# Patient Record
Sex: MALE | Race: BLACK OR AFRICAN AMERICAN | Employment: UNEMPLOYED | URBAN - METROPOLITAN AREA
[De-identification: names, ages, dates, MRNs, and addresses within clinical notes are randomized per-mention and may not be internally consistent; named-entity substitution may affect disease eponyms.]

---

## 2017-09-15 ENCOUNTER — OFFICE VISIT (OUTPATIENT)
Dept: FAMILY MEDICINE | Facility: CLINIC | Age: 37
End: 2017-09-15
Payer: COMMERCIAL

## 2017-09-15 VITALS
WEIGHT: 190 LBS | BODY MASS INDEX: 25.73 KG/M2 | TEMPERATURE: 97.2 F | SYSTOLIC BLOOD PRESSURE: 104 MMHG | DIASTOLIC BLOOD PRESSURE: 70 MMHG | HEART RATE: 92 BPM | HEIGHT: 72 IN | OXYGEN SATURATION: 97 %

## 2017-09-15 DIAGNOSIS — L40.9 PSORIASIS: Primary | ICD-10-CM

## 2017-09-15 DIAGNOSIS — M25.50 ARTHRALGIA, UNSPECIFIED JOINT: ICD-10-CM

## 2017-09-15 DIAGNOSIS — Z23 NEED FOR PROPHYLACTIC VACCINATION WITH TETANUS-DIPHTHERIA (TD): ICD-10-CM

## 2017-09-15 DIAGNOSIS — Z23 NEED FOR PROPHYLACTIC VACCINATION AND INOCULATION AGAINST INFLUENZA: ICD-10-CM

## 2017-09-15 LAB
ALBUMIN SERPL-MCNC: 3.6 G/DL (ref 3.4–5)
ALP SERPL-CCNC: 98 U/L (ref 40–150)
ALT SERPL W P-5'-P-CCNC: 32 U/L (ref 0–70)
ANION GAP SERPL CALCULATED.3IONS-SCNC: 7 MMOL/L (ref 3–14)
AST SERPL W P-5'-P-CCNC: 24 U/L (ref 0–45)
BILIRUB SERPL-MCNC: 0.6 MG/DL (ref 0.2–1.3)
BUN SERPL-MCNC: 14 MG/DL (ref 7–30)
CALCIUM SERPL-MCNC: 9.1 MG/DL (ref 8.5–10.1)
CHLORIDE SERPL-SCNC: 106 MMOL/L (ref 94–109)
CO2 SERPL-SCNC: 25 MMOL/L (ref 20–32)
CREAT SERPL-MCNC: 0.85 MG/DL (ref 0.66–1.25)
CRP SERPL-MCNC: <2.9 MG/L (ref 0–8)
ERYTHROCYTE [DISTWIDTH] IN BLOOD BY AUTOMATED COUNT: 12.9 % (ref 10–15)
ERYTHROCYTE [SEDIMENTATION RATE] IN BLOOD BY WESTERGREN METHOD: 11 MM/H (ref 0–15)
GFR SERPL CREATININE-BSD FRML MDRD: >90 ML/MIN/1.7M2
GLUCOSE SERPL-MCNC: 120 MG/DL (ref 70–99)
HCT VFR BLD AUTO: 44.8 % (ref 40–53)
HGB BLD-MCNC: 14.9 G/DL (ref 13.3–17.7)
MCH RBC QN AUTO: 28.1 PG (ref 26.5–33)
MCHC RBC AUTO-ENTMCNC: 33.3 G/DL (ref 31.5–36.5)
MCV RBC AUTO: 85 FL (ref 78–100)
PLATELET # BLD AUTO: 132 10E9/L (ref 150–450)
POTASSIUM SERPL-SCNC: 3.6 MMOL/L (ref 3.4–5.3)
PROT SERPL-MCNC: 7.4 G/DL (ref 6.8–8.8)
RBC # BLD AUTO: 5.3 10E12/L (ref 4.4–5.9)
SODIUM SERPL-SCNC: 138 MMOL/L (ref 133–144)
WBC # BLD AUTO: 4 10E9/L (ref 4–11)

## 2017-09-15 PROCEDURE — 90715 TDAP VACCINE 7 YRS/> IM: CPT | Performed by: FAMILY MEDICINE

## 2017-09-15 PROCEDURE — 86140 C-REACTIVE PROTEIN: CPT | Performed by: FAMILY MEDICINE

## 2017-09-15 PROCEDURE — 99214 OFFICE O/P EST MOD 30 MIN: CPT | Mod: 25 | Performed by: FAMILY MEDICINE

## 2017-09-15 PROCEDURE — 85652 RBC SED RATE AUTOMATED: CPT | Performed by: FAMILY MEDICINE

## 2017-09-15 PROCEDURE — 85027 COMPLETE CBC AUTOMATED: CPT | Performed by: FAMILY MEDICINE

## 2017-09-15 PROCEDURE — 86431 RHEUMATOID FACTOR QUANT: CPT | Performed by: FAMILY MEDICINE

## 2017-09-15 PROCEDURE — 86038 ANTINUCLEAR ANTIBODIES: CPT | Performed by: FAMILY MEDICINE

## 2017-09-15 PROCEDURE — 90686 IIV4 VACC NO PRSV 0.5 ML IM: CPT | Performed by: FAMILY MEDICINE

## 2017-09-15 PROCEDURE — 36415 COLL VENOUS BLD VENIPUNCTURE: CPT | Performed by: FAMILY MEDICINE

## 2017-09-15 PROCEDURE — 80053 COMPREHEN METABOLIC PANEL: CPT | Performed by: FAMILY MEDICINE

## 2017-09-15 RX ORDER — CLOBETASOL PROPIONATE 0.5 MG/G
OINTMENT TOPICAL
Qty: 60 G | Refills: 3 | Status: SHIPPED | OUTPATIENT
Start: 2017-09-15 | End: 2018-05-23

## 2017-09-15 NOTE — LETTER
September 19, 2017      León Myers  [address]  [facility] [address]        Dear ,    I have reviewed your recent labs. Here are the results:       -Liver and gallbladder tests are normal. (ALT,AST, Alk phos, bilirubin), kidney function is normal (Cr, GFR), Sodium is normal, Potassium is normal, Calcium is normal, Glucose is normal (diabetes screening test).   -Normal red blood cell (hgb) levels, normal white blood cell count and slight low platelets, which has in this range previously.   All labs for joint pain including inflammation labs are also normal.   No inflammation identified.     Resulted Orders   ESR: Erythrocyte sedimentation rate   Result Value Ref Range    Sed Rate 11 0 - 15 mm/h   CRP, inflammation   Result Value Ref Range    CRP Inflammation <2.9 0.0 - 8.0 mg/L   Rheumatoid factor   Result Value Ref Range    Rheumatoid Factor <20 <20 IU/mL   Comprehensive metabolic panel   Result Value Ref Range    Sodium 138 133 - 144 mmol/L    Potassium 3.6 3.4 - 5.3 mmol/L    Chloride 106 94 - 109 mmol/L    Carbon Dioxide 25 20 - 32 mmol/L    Anion Gap 7 3 - 14 mmol/L    Glucose 120 (H) 70 - 99 mg/dL      Comment:      Fasting specimen    Urea Nitrogen 14 7 - 30 mg/dL    Creatinine 0.85 0.66 - 1.25 mg/dL    GFR Estimate >90 >60 mL/min/1.7m2      Comment:      Non  GFR Calc    GFR Estimate If Black >90 >60 mL/min/1.7m2      Comment:       GFR Calc    Calcium 9.1 8.5 - 10.1 mg/dL    Bilirubin Total 0.6 0.2 - 1.3 mg/dL    Albumin 3.6 3.4 - 5.0 g/dL    Protein Total 7.4 6.8 - 8.8 g/dL    Alkaline Phosphatase 98 40 - 150 U/L    ALT 32 0 - 70 U/L    AST 24 0 - 45 U/L   CBC with platelets   Result Value Ref Range    WBC 4.0 4.0 - 11.0 10e9/L    RBC Count 5.30 4.4 - 5.9 10e12/L    Hemoglobin 14.9 13.3 - 17.7 g/dL    Hematocrit 44.8 40.0 - 53.0 %    MCV 85 78 - 100 fl    MCH 28.1 26.5 - 33.0 pg    MCHC 33.3 31.5 - 36.5 g/dL    RDW 12.9 10.0 - 15.0 %    Platelet  Count 132 (L) 150 - 450 10e9/L      Comment:      Results confirmed by repeat test   Anti Nuclear Kassi IgG by IFA with Reflex   Result Value Ref Range    YESSICA interpretation Negative NEG^Negative      Comment:                                         Reference range:  <1:40  NEGATIVE  1:40 - 1:80  BORDERLINE POSITIVE  >1:80 POSITIVE         If you have any questions or concerns, please call the clinic at the number listed above.       Sincerely,  Sidney Corona MD

## 2017-09-15 NOTE — PROGRESS NOTES
SUBJECTIVE:   León Myers is a 37 year old male who presents to clinic today for the following health issues:      Rash  Onset: ~5 years   On the legs bilateral but now more worse on the right leg, silvery dry scales.  Its itchy. He has used once cream 2 years ago but it has not helped.      Description:   Location: legs   Character: flakey  Itching (Pruritis): yes      Progression of Symptoms:  worsening    Accompanying Signs & Symptoms:  Fever: no   Body aches or joint pain: no   Sore throat symptoms: no   Recent cold symptoms: no     History:   Previous similar rash: no     Precipitating factors:   Exposure to similar rash: no   New exposures: None   Recent travel: no       Therapies Tried and outcome: otc cream no help     Also complains of joint pain bilateral ankle and knee. Would like to get some labs work done.          Problem list and histories reviewed & adjusted, as indicated.  Additional history: as documented    Patient Active Problem List   Diagnosis     Neck pain     Esophageal reflux     H. pylori infection     Body aches     Past Surgical History:   Procedure Laterality Date     NO HISTORY OF SURGERY         Social History   Substance Use Topics     Smoking status: Current Every Day Smoker     Packs/day: 0.50     Types: Cigarettes     Smokeless tobacco: Never Used     Alcohol use No     Family History   Problem Relation Age of Onset     DIABETES No family hx of      C.A.D. No family hx of          Current Outpatient Prescriptions   Medication Sig Dispense Refill     clobetasol (TEMOVATE) 0.05 % ointment Apply sparingly to affected area twice daily as needed.  Do not apply to face. 60 g 3         Reviewed and updated as needed this visit by clinical staffTobacco  Allergies  Meds  Soc Hx      Reviewed and updated as needed this visit by Provider         ROS:  C: NEGATIVE for fever, chills, change in weight  INTEGUMENTARY/SKIN: scaly rash on the legs.  E/M: NEGATIVE for ear, mouth and throat  problems  R: NEGATIVE for significant cough or SOB  CV: NEGATIVE for chest pain, palpitations or peripheral edema    OBJECTIVE:                                                    /70  Pulse 92  Temp 97.2  F (36.2  C) (Tympanic)  Ht 6' (1.829 m)  Wt 190 lb (86.2 kg)  SpO2 97%  BMI 25.77 kg/m2  Body mass index is 25.77 kg/(m^2).   GENERAL: healthy, alert, well nourished, well hydrated, no distress  NECK: no tenderness, no adenopathy, no asymmetry, no masses, no stiffness; thyroid- normal to palpation  RESP: lungs clear to auscultation - no rales, no rhonchi, no wheezes  CV: regular rates and rhythm, normal S1 S2, no S3 or S4 and no murmur, no click or rub -  Patient has slivery scales rash on the back of the leg.     ASSESSMENT/PLAN:                                                        ICD-10-CM    1. Psoriasis L40.9 clobetasol (TEMOVATE) 0.05 % ointment     DERMATOLOGY REFERRAL     ESR: Erythrocyte sedimentation rate     CRP, inflammation     CBC with platelets   2. Need for prophylactic vaccination and inoculation against influenza Z23 FLU VAC, SPLIT VIRUS IM > 3 YO (QUADRIVALENT) [76545]   3. Need for prophylactic vaccination with tetanus-diphtheria (TD) Z23 TDAP VACCINE (ADACEL)   4. Arthralgia, unspecified joint M25.50 ESR: Erythrocyte sedimentation rate     CRP, inflammation     Rheumatoid factor     Comprehensive metabolic panel     Anti Nuclear Kassi IgG by IFA with Reflex       Patient has some chronic changes on the skin most likely psoriasis in nature.  Suggested high strength steroid cream to be applied on the skin.   Referral to dermatologist given.  Joint pain non specific but they are symmetrical will get some inflamatory labs and will follow up on that. If needed further evaluation can be done.    Sidney Corona MD  [facility]  Injectable Influenza Immunization Documentation    1.  Are you sick today? (Fever of 100.5 or higher on the day of the clinic)   No    2.  Have you  ever had Guillain-[place] Syndrome within 6 weeks of an influenza vaccionation?  No    3. Do you have a life-threatening allergy to eggs?  No    4. Do you have a life-threatening allergy to a component of the vaccine? May include antibiotics, gelatin or latex.  No     5. Have you ever had a reaction to a dose of flu vaccine that needed immediate medical attention?  No     Form completed by Jose PAYTON CMA

## 2017-09-15 NOTE — NURSING NOTE
Chief Complaint   Patient presents with     Derm Problem     patches of dry skin on legs        Initial /70  Pulse 92  Temp 97.2  F (36.2  C) (Tympanic)  Ht 6' (1.829 m)  Wt 190 lb (86.2 kg)  SpO2 97%  BMI 25.77 kg/m2 Estimated body mass index is 25.77 kg/(m^2) as calculated from the following:    Height as of this encounter: 6' (1.829 m).    Weight as of this encounter: 190 lb (86.2 kg).  Medication Reconciliation: complete    Current Outpatient Prescriptions   Medication Sig Dispense Refill     naproxen (NAPROSYN) 500 MG tablet Take 1 tablet (500 mg) by mouth 2 times daily (with meals) (Patient not taking: Reported on 9/15/2017) 30 tablet 0     benzonatate (TESSALON) 100 MG capsule Take 1 capsule (100 mg) by mouth 3 times daily as needed (Patient not taking: Reported on 9/15/2017) 30 capsule 0     nabumetone (RELAFEN) 500 MG tablet Take 1-2 tablets (500-1,000 mg) by mouth 2 times daily as needed for moderate pain (Patient not taking: Reported on 9/15/2017) 30 tablet 1     triamcinolone (KENALOG) 0.1 % cream Apply sparingly to affected area three times daily for 14 days. (Patient not taking: Reported on 9/15/2017) 15 g 0     Cholecalciferol (VITAMIN D) 2000 UNITS tablet Take 2,000 Units by mouth daily (Patient not taking: Reported on 9/15/2017) 100 tablet 3     omeprazole (PRILOSEC) 40 MG capsule Take 1 capsule (40 mg) by mouth daily Take 30-60 minutes before a meal. (Patient not taking: Reported on 9/15/2017) 90 capsule 3     nabumetone (RELAFEN) 500 MG tablet Take 1-2 tablets (500-1,000 mg) by mouth 2 times daily as needed for moderate pain (Patient not taking: Reported on 9/15/2017) 30 tablet 1       Jose PAYTON CMA

## 2017-09-17 LAB — RHEUMATOID FACT SER NEPH-ACNC: <20 IU/ML (ref 0–20)

## 2017-09-19 LAB — ANA SER QL IF: NEGATIVE

## 2017-12-04 ENCOUNTER — RADIANT APPOINTMENT (OUTPATIENT)
Dept: GENERAL RADIOLOGY | Facility: CLINIC | Age: 37
End: 2017-12-04
Attending: FAMILY MEDICINE
Payer: COMMERCIAL

## 2017-12-04 ENCOUNTER — OFFICE VISIT (OUTPATIENT)
Dept: FAMILY MEDICINE | Facility: CLINIC | Age: 37
End: 2017-12-04
Payer: COMMERCIAL

## 2017-12-04 ENCOUNTER — OFFICE VISIT (OUTPATIENT)
Dept: ORTHOPEDICS | Facility: CLINIC | Age: 37
End: 2017-12-04
Payer: COMMERCIAL

## 2017-12-04 VITALS
TEMPERATURE: 97.5 F | HEART RATE: 87 BPM | DIASTOLIC BLOOD PRESSURE: 70 MMHG | SYSTOLIC BLOOD PRESSURE: 100 MMHG | WEIGHT: 191 LBS | BODY MASS INDEX: 25.9 KG/M2

## 2017-12-04 VITALS
HEIGHT: 72 IN | BODY MASS INDEX: 25.87 KG/M2 | DIASTOLIC BLOOD PRESSURE: 70 MMHG | SYSTOLIC BLOOD PRESSURE: 110 MMHG | WEIGHT: 191 LBS

## 2017-12-04 DIAGNOSIS — M54.42 CHRONIC LEFT-SIDED LOW BACK PAIN WITH LEFT-SIDED SCIATICA: ICD-10-CM

## 2017-12-04 DIAGNOSIS — M25.552 PAIN IN JOINT INVOLVING LEFT PELVIC REGION AND THIGH: ICD-10-CM

## 2017-12-04 DIAGNOSIS — G89.29 CHRONIC LEFT-SIDED LOW BACK PAIN WITH LEFT-SIDED SCIATICA: ICD-10-CM

## 2017-12-04 DIAGNOSIS — M20.12 HALLUX VALGUS, ACQUIRED, LEFT: ICD-10-CM

## 2017-12-04 DIAGNOSIS — L84 CALLUS OF FOOT: Primary | ICD-10-CM

## 2017-12-04 DIAGNOSIS — M25.552 PAIN IN JOINT INVOLVING LEFT PELVIC REGION AND THIGH: Primary | ICD-10-CM

## 2017-12-04 PROCEDURE — 99214 OFFICE O/P EST MOD 30 MIN: CPT | Performed by: FAMILY MEDICINE

## 2017-12-04 PROCEDURE — 73502 X-RAY EXAM HIP UNI 2-3 VIEWS: CPT

## 2017-12-04 PROCEDURE — 72100 X-RAY EXAM L-S SPINE 2/3 VWS: CPT

## 2017-12-04 PROCEDURE — 99213 OFFICE O/P EST LOW 20 MIN: CPT | Mod: 25 | Performed by: PHYSICIAN ASSISTANT

## 2017-12-04 PROCEDURE — 11056 PARNG/CUTG B9 HYPRKR LES 2-4: CPT | Mod: T4 | Performed by: PHYSICIAN ASSISTANT

## 2017-12-04 ASSESSMENT — ENCOUNTER SYMPTOMS
BACK PAIN: 1
FOCAL WEAKNESS: 0
MYALGIAS: 1
TINGLING: 1
SENSORY CHANGE: 1

## 2017-12-04 NOTE — NURSING NOTE
Chief Complaint   Patient presents with     Headache     Musculoskeletal Problem       Initial /70 (BP Location: Left arm, Patient Position: Chair, Cuff Size: Adult Regular)  Pulse 87  Temp 97.5  F (36.4  C) (Tympanic)  Wt 191 lb (86.6 kg)  BMI 25.9 kg/m2 Estimated body mass index is 25.9 kg/(m^2) as calculated from the following:    Height as of 9/15/17: 6' (1.829 m).    Weight as of this encounter: 191 lb (86.6 kg).  Medication Reconciliation: complete

## 2017-12-04 NOTE — PROGRESS NOTES
[facility]   Clinic Visit s Dec 4, 2017    PCP: Eusebio Farnsworth      León is a 37 year old male who is seen as self referral for   Chief Complaint   Patient presents with     Hip Pain       Injury: Reports insidious onset without acute precipitating event.    Location of Pain: left lateral ankle, posterior and anterior hip  Duration of Pain:  2 years, worse in last 2 month(s)  Rating of Pain at worst: 9/10  Rating of Pain Currently: 6/10  Pain is worse with: walking, bending and standing  Pain is better with: resting and sitting  Treatment so far consists of: ice, heat and ibuprofen  Associated symptoms: weakness of left leg, intermittent tingling and numbness  Prior History of related problems: none    Social History: works at [employer]     Past Medical History:   Diagnosis Date     Body aches 6/26/2014     Ear pain 6/26/2014     TB (tuberculosis), treated      He was treated for TB in 1991 in [facility]        Patient Active Problem List    Diagnosis Date Noted     Body aches 06/26/2014     Priority: Medium     Neck pain 06/18/2014     Priority: Medium     Esophageal reflux 06/18/2014     Priority: Medium     H. pylori infection 06/18/2014     Priority: Medium       Family History   Problem Relation Age of Onset     DIABETES No family hx of      C.A.D. No family hx of        Social History     Social History     Marital status:      Spouse name: N/A     Number of children: N/A     Years of education: N/A     Social History Main Topics     Smoking status: Current Every Day Smoker     Packs/day: 0.50     Types: Cigarettes     Smokeless tobacco: Never Used     Past Surgical History:   Procedure Laterality Date     NO HISTORY OF SURGERY         Review of Systems   Musculoskeletal: Positive for back pain, joint pain and myalgias.   Neurological: Positive for tingling and sensory change. Negative for focal weakness.   All other systems reviewed and are negative.        Physical Exam  BP  110/70  Ht 6' (1.829 m)  Wt 191 lb (86.6 kg)  BMI 25.9 kg/m2  Constitutional:well-developed, well-nourished, and in no distress.   Cardiovascular: Intact distal pulses.    Neurological: alert. Gait Normal:   Gait, station, stance, and balance appear normal for age  Skin: Skin is warm and dry.   Psychiatric: Mood and affect normal.   Respiratory: unlabored, speaks in full sentences  Lymph: no LAD, no lymphangitis      Left Ankle Exam   Swelling: None.    Tenderness   None    Range of Motion   Dorsiflexion:     Normal  Plantar flexion: Normal  Inversion:         Normal  Eversion:         Normal    Muscle Strength   Dorsiflexion:          5/5  Plantar flexion:      5/5  Anterior tibial:        5/5  Posterior tibial:      5/5  Gastrosoleus:       5/5  Peroneal muscle: 5/5    Tests   Anterior drawer: Negative.  Varus tilt: Negative.    Left Hip Exam   Gait: Normal.    Tenderness   None    Range of Motion   Extension:            Normal  Flexion:                 Normal  Internal Rotation:  Normal  External Rotation: Normal  Abduction:            Normal  Adduction:            Normal    Muscle Strength   Abduction:  5/5  Adduction:  5/5  Flexion:      4/5    Tests   Solis: Negative  Arnold:  N/t    Right Hip Exam   Gait: Normal.    Back Exam   Sensation: Normal.  Gait: Normal.    Tenderness   None    Range of Motion   Flexion:                    Abnormal  Extension:                Abnormal  Lateral Bend Left:    Normal  Lateral Bend Right:  Normal  Rotation Right:         Normal  Rotation Left:           Normal  SLR    Right: Negative  Left:    + at 70 deg    Muscle Strength   Normal back strength    Tests   Toe Walk: Normal  Heel Walk: Normal    Reflexes   Patellar:  Normal  Achilles:  Normal          X-ray images Ordered and independently reviewed by me in the office today with the patient. X-ray shows: normal      Recent Results (from the past 48 hour(s))   XR Lumbar Spine 2/3 Views    Narrative    XR LUMBAR SPINE 2-3  VIEWS 12/4/2017 7:01 PM     HISTORY: ; Chronic left-sided low back pain with left-sided sciatica;  Chronic left-sided low back pain with left-sided sciatica      Impression    IMPRESSION: Negative exam.    TAMIKO DALAL MD   XR Pelvis and Hip Left 1 View    Narrative    XR PELVIS AND HIP LEFT 1 VIEW 12/4/2017 7:02 PM     HISTORY: ; Pain in joint involving left pelvic region and thigh      Impression    IMPRESSION: Negative exam.    TAMIKO DALAL MD         ASSESSMENT/PLAN    ICD-10-CM    1. Pain in joint involving left pelvic region and thigh M25.552 XR Pelvis and Hip Left 1 View   2. Chronic left-sided low back pain with left-sided sciatica M54.42 XR Lumbar Spine 2/3 Views    G89.29 MR Lumbar Spine w/o Contrast     Poorly defined LLE pain with lumbar component, with minimal objective radicular findings. longstanding symptoms with radicular history raise concern nerve root impingement    MRI ordered, León instructed to return at least 2 days following MRI to review results and determine treatment plan

## 2017-12-04 NOTE — MR AVS SNAPSHOT
After Visit Summary   12/4/2017    León Myers    MRN: [id number]           Patient Information     Date Of Birth          1980        Visit Information        Provider Department      12/4/2017 1:30 PM Evan Bedolla PA-C; [facility] [place]        Today's Diagnoses     Callus of foot    -  1    Hallux valgus, acquired, left           Follow-ups after your visit        Additional Services     PODIATRY/FOOT & ANKLE SURGERY REFERRAL       Your provider has referred you to: [facility]: [facility] [place] [phone]   [email or web address]    Please be aware that coverage of these services is subject to the terms and limitations of your health insurance plan.  Call member services at your health plan with any benefit or coverage questions.      Please bring the following to your appointment:  >>   Any x-rays, CTs or MRIs which have been performed.  Contact the facility where they were done to arrange for  prior to your scheduled appointment.    >>   List of current medications   >>   This referral request   >>   Any documents/labs given to you for this referral                  Your next 10 appointments already scheduled     Dec 04, 2017  6:00 PM CST   New Visit with Romario Moreira MD   [facility] [place] [facility] ([facility] [place])    [address] [place] [address] [address] [facility] [address]  [place] [address] [phone]              Who to contact     If you have questions or need follow up information about today's clinic visit or your schedule please contact [facility] [place] directly at [phone].  Normal or non-critical lab and imaging results will be communicated to you by MyChart, letter or phone within 4 business days after the clinic has received the results. If you do not hear from us within 7 days, please contact the clinic through MyChart or phone. If you  "have a critical or abnormal lab result, we will notify you by phone as soon as possible.  Submit refill requests through [employer] or call your pharmacy and they will forward the refill request to us. Please allow 3 business days for your refill to be completed.          Additional Information About Your Visit        [employer] Information     [employer] lets you send messages to your doctor, view your test results, renew your prescriptions, schedule appointments and more. To sign up, go to [email or web address] . Click on \"Log in\" on the left side of the screen, which will take you to the Welcome page. Then click on \"Sign up Now\" on the right side of the page.     You will be asked to enter the access code listed below, as well as some personal information. Please follow the directions to create your username and password.     Your access code is: [email or web address]  Expires: 2017  9:04 AM     Your access code will  in 90 days. If you need help or a new code, please call your [facility] clinic or [phone].        Care EveryWhere ID     This is your Care EveryWhere ID. This could be used by other organizations to access your [facility] medical records  [id number]        Your Vitals Were     Pulse Temperature BMI (Body Mass Index)             87 97.5  F (36.4  C) (Tympanic) 25.9 kg/m2          Blood Pressure from Last 3 Encounters:   17 100/70   09/15/17 104/70   16 100/80    Weight from Last 3 Encounters:   17 191 lb (86.6 kg)   09/15/17 190 lb (86.2 kg)   16 183 lb (83 kg)              We Performed the Following     PODIATRY/FOOT & ANKLE SURGERY REFERRAL          Today's Medication Changes          These changes are accurate as of: 17  2:33 PM.  If you have any questions, ask your nurse or doctor.               Start taking these medicines.        Dose/Directions    Salicylic Acid 40 % Pads   Commonly known as:  CORN REMOVERS   Used for:  Callus of foot   Started by:  Evan Bedolla " JOEY Garcia        Dose:  1 each   Externally apply 1 each topically daily   Quantity:  4 each   Refills:  0            Where to get your medicines      These medications were sent to [id number] - [place] - [address] AT [facility] OF [facility] [address] [facility]  [address], [place] [address]     Phone:  [phone]     Salicylic Acid 40 % Pads                Primary Care Provider Office Phone # Fax #    Eusebio KHAN MD Javad [phone]       [address]  [place] [address]        Equal Access to Services     [facility] : Hadii aad ku hadasho Soomaali, waaxda luqadaha, qaybta kaalmada adeegyada, waxay idiin hayaan adeeg reynaldo spencer ah. So [facility] [place] [phone].    ATENCIÓN: Si habla español, tiene a fraser disposición servicios gratuitos de asistencia lingüística. [facility] [phone].    We comply with applicable federal civil rights laws and [place] laws. We do not discriminate on the basis of race, color, national origin, age, disability, sex, sexual orientation, or gender identity.            Thank you!     Thank you for choosing [facility]  for your care. Our goal is always to provide you with excellent care. Hearing back from our patients is one way we can continue to improve our services. Please take a few minutes to complete the written survey that you may receive in the mail after your visit with us. Thank you!             Your Updated Medication List - Protect others around you: Learn how to safely use, store and throw away your medicines at www.disposemymeds.org.          This list is accurate as of: 12/4/17  2:33 PM.  Always use your most recent med list.                   Brand Name Dispense Instructions for use Diagnosis    clobetasol 0.05 % ointment    TEMOVATE    60 g    Apply sparingly to affected area twice daily as needed.  Do not apply to face.    Psoriasis       Salicylic Acid 40 % Pads    CORN REMOVERS     4 each    Externally apply 1 each topically daily    Callus of foot

## 2017-12-04 NOTE — LETTER
12/4/2017         RE: León Myers  [address] [facility] [place] [address]        Dear Colleague,    Thank you for referring your patient, León Myers, to the [facility]. Please see a copy of my visit note below.    HPI   [facility]   Clinic Visit s Dec 4, 2017    PCP: Eusebio Farnsworth      León is a 37 year old male who is seen as self referral for   Chief Complaint   Patient presents with     Hip Pain       Injury: Reports insidious onset without acute precipitating event.    Location of Pain: left lateral ankle, posterior and anterior hip  Duration of Pain:  2 years, worse in last 2 month(s)  Rating of Pain at worst: 9/10  Rating of Pain Currently: 6/10  Pain is worse with: walking, bending and standing  Pain is better with: resting and sitting  Treatment so far consists of: ice, heat and ibuprofen  Associated symptoms: weakness of left leg, intermittent tingling and numbness  Prior History of related problems: none    Social History: works at [employer]     Past Medical History:   Diagnosis Date     Body aches 6/26/2014     Ear pain 6/26/2014     TB (tuberculosis), treated      He was treated for TB in 1991 in [facility]        Patient Active Problem List    Diagnosis Date Noted     Body aches 06/26/2014     Priority: Medium     Neck pain 06/18/2014     Priority: Medium     Esophageal reflux 06/18/2014     Priority: Medium     H. pylori infection 06/18/2014     Priority: Medium       Family History   Problem Relation Age of Onset     DIABETES No family hx of      C.A.D. No family hx of        Social History     Social History     Marital status:      Spouse name: N/A     Number of children: N/A     Years of education: N/A     Social History Main Topics     Smoking status: Current Every Day Smoker     Packs/day: 0.50     Types: Cigarettes     Smokeless tobacco: Never Used     Past Surgical History:   Procedure Laterality Date      NO HISTORY OF SURGERY         Review of Systems   Musculoskeletal: Positive for back pain, joint pain and myalgias.   Neurological: Positive for tingling and sensory change. Negative for focal weakness.   All other systems reviewed and are negative.        Physical Exam  /70  Ht 6' (1.829 m)  Wt 191 lb (86.6 kg)  BMI 25.9 kg/m2  Constitutional:well-developed, well-nourished, and in no distress.   Cardiovascular: Intact distal pulses.    Neurological: alert. Gait Normal:   Gait, station, stance, and balance appear normal for age  Skin: Skin is warm and dry.   Psychiatric: Mood and affect normal.   Respiratory: unlabored, speaks in full sentences  Lymph: no LAD, no lymphangitis      Left Ankle Exam   Swelling: None.    Tenderness   None    Range of Motion   Dorsiflexion:     Normal  Plantar flexion: Normal  Inversion:         Normal  Eversion:         Normal    Muscle Strength   Dorsiflexion:          5/5  Plantar flexion:      5/5  Anterior tibial:        5/5  Posterior tibial:      5/5  Gastrosoleus:       5/5  Peroneal muscle: 5/5    Tests   Anterior drawer: Negative.  Varus tilt: Negative.    Left Hip Exam   Gait: Normal.    Tenderness   None    Range of Motion   Extension:            Normal  Flexion:                 Normal  Internal Rotation:  Normal  External Rotation: Normal  Abduction:            Normal  Adduction:            Normal    Muscle Strength   Abduction:  5/5  Adduction:  5/5  Flexion:      4/5    Tests   Solis: Negative  Arnold:  N/t    Right Hip Exam   Gait: Normal.    Back Exam   Sensation: Normal.  Gait: Normal.    Tenderness   None    Range of Motion   Flexion:                    Abnormal  Extension:                Abnormal  Lateral Bend Left:    Normal  Lateral Bend Right:  Normal  Rotation Right:         Normal  Rotation Left:           Normal  SLR    Right: Negative  Left:    + at 70 deg    Muscle Strength   Normal back strength    Tests   Toe Walk: Normal  Heel Walk:  Normal    Reflexes   Patellar:  Normal  Achilles:  Normal          X-ray images Ordered and independently reviewed by me in the office today with the patient. X-ray shows: normal      Recent Results (from the past 48 hour(s))   XR Lumbar Spine 2/3 Views    Narrative    XR LUMBAR SPINE 2-3 VIEWS 12/4/2017 7:01 PM     HISTORY: ; Chronic left-sided low back pain with left-sided sciatica;  Chronic left-sided low back pain with left-sided sciatica      Impression    IMPRESSION: Negative exam.    TAMIKO DALAL MD   XR Pelvis and Hip Left 1 View    Narrative    XR PELVIS AND HIP LEFT 1 VIEW 12/4/2017 7:02 PM     HISTORY: ; Pain in joint involving left pelvic region and thigh      Impression    IMPRESSION: Negative exam.    TAMIKO DALAL MD         ASSESSMENT/PLAN    ICD-10-CM    1. Pain in joint involving left pelvic region and thigh M25.552 XR Pelvis and Hip Left 1 View   2. Chronic left-sided low back pain with left-sided sciatica M54.42 XR Lumbar Spine 2/3 Views    G89.29 MR Lumbar Spine w/o Contrast     Poorly defined LLE pain with lumbar component, with minimal objective radicular findings. longstanding symptoms with radicular history raise concern nerve root impingement    MRI ordered, Lenó instructed to return at least 2 days following MRI to review results and determine treatment plan       Again, thank you for allowing me to participate in the care of your patient.        Sincerely,        Romario Moreira MD

## 2017-12-04 NOTE — PROGRESS NOTES
SUBJECTIVE:   León Myers is a 37 year old male who presents to clinic today for the following health issues      Concern - On pts left foot, for two months he has a callous, and what appears to be a toenail growing out the side of his 5th toe. Is painful, cannot wear closed toe shoes     Left 5th toe callus on lateral aspect for years, extra growth of 5th toenail.  This has become very painful for him over the past few months.  Would like to discuss next steps      Problem list and histories reviewed & adjusted, as indicated.  Additional history:     Patient Active Problem List   Diagnosis     Neck pain     Esophageal reflux     H. pylori infection     Body aches     Past Surgical History:   Procedure Laterality Date     NO HISTORY OF SURGERY         Social History   Substance Use Topics     Smoking status: Current Every Day Smoker     Packs/day: 0.50     Types: Cigarettes     Smokeless tobacco: Never Used     Alcohol use No     Family History   Problem Relation Age of Onset     DIABETES No family hx of      C.A.D. No family hx of          Current Outpatient Prescriptions   Medication Sig Dispense Refill     Salicylic Acid (CORN REMOVERS) 40 % PADS Externally apply 1 each topically daily 4 each 0     clobetasol (TEMOVATE) 0.05 % ointment Apply sparingly to affected area twice daily as needed.  Do not apply to face. (Patient not taking: Reported on 12/4/2017) 60 g 3     Allergies   Allergen Reactions     Nka [No Known Allergies]          Reviewed and updated as needed this visit by clinical staff     Reviewed and updated as needed this visit by Provider         ROS:  Constitutional, HEENT, cardiovascular, pulmonary, gi and gu systems are negative, except as otherwise noted.      OBJECTIVE:   /70 (BP Location: Left arm, Patient Position: Chair, Cuff Size: Adult Regular)  Pulse 87  Temp 97.5  F (36.4  C) (Tympanic)  Wt 191 lb (86.6 kg)  BMI 25.9 kg/m2  Body mass index is 25.9 kg/(m^2).  GENERAL:  healthy, alert and no distress  MS: deformity of growth of left 2nd toe, chronic appearing calluses between 1st and 2nd toes and on lateral aspect of 5th toe. hyperkeratotic growth adjacent to left 5th digit    Diagnostic Test Results:  none     Procedure:  Area wiped with alcohol pad. 15 blade scalpel used to pare down hyperkeritotic skin on left 5th toe. No complications or bleeding.  Home care instructions given    ASSESSMENT/PLAN:     1. Callus of foot  Callus of lateral aspect of left 5th toe and extra growth of 5th toenail.  Both areas were pared down with scalpel as above without complications.  He does have some deformity of his 2nd left toe and hallux valgus that may be contributing to his chronic calluses. Advised wide fitting, comfortable shoes, and he would like to see podiatry for long term management options.  - Salicylic Acid (CORN REMOVERS) 40 % PADS; Externally apply 1 each topically daily  Dispense: 4 each; Refill: 0  - TRIM HYPERKERATOTIC SKIN LESION,2-4    2. Hallux valgus, acquired, left  - PODIATRY/FOOT & ANKLE SURGERY REFERRAL      Follow-Up: follow up with podiatry for surgical options    Evan Bedolla PA-C  [facility]

## 2017-12-04 NOTE — MR AVS SNAPSHOT
"              After Visit Summary   12/4/2017    León Myers    MRN: [id number]           Patient Information     Date Of Birth          1980        Visit Information        Provider Department      12/4/2017 6:00 PM Romario Moreira MD [facility]        Today's Diagnoses     Pain in joint involving left pelvic region and thigh    -  1    Chronic left-sided low back pain with left-sided sciatica           Follow-ups after your visit        Future tests that were ordered for you today     Open Future Orders        Priority Expected Expires Ordered    MR Lumbar Spine w/o Contrast Routine  12/4/2018 12/4/2017            Who to contact     If you have questions or need follow up information about today's clinic visit or your schedule please contact [facility] directly at [phone].  Normal or non-critical lab and imaging results will be communicated to you by MyChart, letter or phone within 4 business days after the clinic has received the results. If you do not hear from us within 7 days, please contact the clinic through MyChart or phone. If you have a critical or abnormal lab result, we will notify you by phone as soon as possible.  Submit refill requests through [email or web address] or call your pharmacy and they will forward the refill request to us. Please allow 3 business days for your refill to be completed.          Additional Information About Your Visit        MyChart Information     [email or web address] lets you send messages to your doctor, view your test results, renew your prescriptions, schedule appointments and more. To sign up, go to [email or web address] . Click on \"Log in\" on the left side of the screen, which will take you to the Welcome page. Then click on \"Sign up Now\" on the right side of the page.     You will be asked to enter the access code listed below, as well as some personal information. Please " follow the directions to create your username and password.     Your access code is: [email or web address]  Expires: 2017  9:04 AM     Your access code will  in 90 days. If you need help or a new code, please call your [place] clinic or [phone].        Care EveryWhere ID     This is your Care EveryWhere ID. This could be used by other organizations to access your [facility] medical records  [id number]        Your Vitals Were     Height BMI (Body Mass Index)                6' (1.829 m) 25.9 kg/m2           Blood Pressure from Last 3 Encounters:   17 110/70   17 100/70   09/15/17 104/70    Weight from Last 3 Encounters:   17 191 lb (86.6 kg)   17 191 lb (86.6 kg)   09/15/17 190 lb (86.2 kg)                 Today's Medication Changes          These changes are accurate as of: 17 11:59 PM.  If you have any questions, ask your nurse or doctor.               Start taking these medicines.        Dose/Directions    Salicylic Acid 40 % Pads   Commonly known as:  CORN REMOVERS   Used for:  Callus of foot   Started by:  Evan Bedolla PA-C        Dose:  1 each   Externally apply 1 each topically daily   Quantity:  4 each   Refills:  0            Where to get your medicines      These medications were sent to [facility] [address] [facility] [address] AT [address]  [address] [facility] [address]     Phone:  [phone]     Salicylic Acid 40 % Pads                Primary Care Provider Office Phone # Fax #    Eusebio Farnsworth [phone]       [address] [facility] [address]        Equal Access to Services     [facility] OZZY AH: Hadii sujata mareso Soxin, waaxda luqadaha, qaybta kaalmada adeegyada, ho ward. So [facility] [phone].    ATENCIÓN: Si habla español, tiene a fraser disposición servicios gratuitos de asistencia lingüística. Llame al [phone].    We  comply with applicable federal civil rights laws and [place] laws. We do not discriminate on the basis of race, color, national origin, age, disability, sex, sexual orientation, or gender identity.            Thank you!     Thank you for choosing [facility]  for your care. Our goal is always to provide you with excellent care. Hearing back from our patients is one way we can continue to improve our services. Please take a few minutes to complete the written survey that you may receive in the mail after your visit with us. Thank you!             Your Updated Medication List - Protect others around you: Learn how to safely use, store and throw away your medicines at www.disposemymeds.org.          This list is accurate as of: 12/4/17 11:59 PM.  Always use your most recent med list.                   Brand Name Dispense Instructions for use Diagnosis    clobetasol 0.05 % ointment    TEMOVATE    60 g    Apply sparingly to affected area twice daily as needed.  Do not apply to face.    Psoriasis       Salicylic Acid 40 % Pads    CORN REMOVERS    4 each    Externally apply 1 each topically daily    Callus of foot

## 2017-12-06 ENCOUNTER — TRANSFERRED RECORDS (OUTPATIENT)
Dept: HEALTH INFORMATION MANAGEMENT | Facility: CLINIC | Age: 37
End: 2017-12-06

## 2017-12-11 ENCOUNTER — OFFICE VISIT (OUTPATIENT)
Dept: ORTHOPEDICS | Facility: CLINIC | Age: 37
End: 2017-12-11
Payer: COMMERCIAL

## 2017-12-11 VITALS
DIASTOLIC BLOOD PRESSURE: 70 MMHG | BODY MASS INDEX: 25.87 KG/M2 | HEIGHT: 72 IN | SYSTOLIC BLOOD PRESSURE: 110 MMHG | WEIGHT: 191 LBS

## 2017-12-11 DIAGNOSIS — M62.89 MUSCLE TIGHTNESS: ICD-10-CM

## 2017-12-11 DIAGNOSIS — M79.605 PAIN OF LEFT LOWER EXTREMITY: Primary | ICD-10-CM

## 2017-12-11 PROCEDURE — 99213 OFFICE O/P EST LOW 20 MIN: CPT | Performed by: FAMILY MEDICINE

## 2017-12-11 RX ORDER — CYCLOBENZAPRINE HCL 5 MG
TABLET ORAL
Qty: 30 TABLET | Refills: 0 | Status: SHIPPED | OUTPATIENT
Start: 2017-12-11 | End: 2018-05-23

## 2017-12-11 ASSESSMENT — ENCOUNTER SYMPTOMS
SENSORY CHANGE: 1
BACK PAIN: 1
MYALGIAS: 1
TINGLING: 1
FOCAL WEAKNESS: 0

## 2017-12-11 NOTE — MR AVS SNAPSHOT
"              After Visit Summary   12/11/2017    León Myers    MRN: [id number]           Patient Information     Date Of Birth          1980        Visit Information        Provider Department      12/11/2017 6:20 PM Romario Moreira MD [facility]        Today's Diagnoses     Pain of left lower extremity    -  1    Muscle tightness           Follow-ups after your visit        Your next 10 appointments already scheduled     Dec 12, 2017  4:00 PM CST   [facility]ED[facility]    [address] [place] [id number] [phone]              Who to contact     If you have questions or need follow up information about today's clinic visit or your schedule please contact [facility] directly at [phone].  Normal or non-critical lab and imaging results will be communicated to you by MyChart, letter or phone within 4 business days after the clinic has received the results. If you do not hear from us within 7 days, please contact the clinic through Neomendhart or phone. If you have a critical or abnormal lab result, we will notify you by phone as soon as possible.  Submit refill requests through SpinGo or call your pharmacy and they will forward the refill request to us. Please allow 3 business days for your refill to be completed.          Additional Information About Your Visit        MyChart Information     SpinGo lets you send messages to your doctor, view your test results, renew your prescriptions, schedule appointments and more. To sign up, go to [email or web address] . Click on \"Log in\" on the left side of the screen, which will take you to the Welcome page. Then click on \"Sign up Now\" on the right side of the page.     You will be asked to enter the access code listed below, as well as some personal information. Please follow " the directions to create your username and password.     Your access code is: [email or web address]  Expires: 2017  9:04 AM     Your access code will  in 90 days. If you need help or a new code, please call your [place] clinic or [phone].        Care EveryWhere ID     This is your Care EveryWhere ID. This could be used by other organizations to access your [place] medical records  [id number]        Your Vitals Were     Height BMI (Body Mass Index)                6' (1.829 m) 25.9 kg/m2           Blood Pressure from Last 3 Encounters:   17 110/70   17 110/70   17 100/70    Weight from Last 3 Encounters:   17 191 lb (86.6 kg)   17 191 lb (86.6 kg)   17 191 lb (86.6 kg)              Today, you had the following     No orders found for display         Today's Medication Changes          These changes are accurate as of: 17  9:54 PM.  If you have any questions, ask your nurse or doctor.               Start taking these medicines.        Dose/Directions    cyclobenzaprine 5 MG tablet   Commonly known as:  FLEXERIL   Used for:  Pain of left lower extremity, Muscle tightness   Started by:  Romario Moreira MD        Take 1-2 tabs as needed at bedtime for pain interrupting sleep   Quantity:  30 tablet   Refills:  0            Where to get your medicines      These medications were sent to [facility] [address]     Phone:  [phone]     cyclobenzaprine 5 MG tablet                Primary Care Provider Office Phone # Fax #    Eusebio Farnsworth [phone]       [address] [place] [address]        Equal Access to Services     SAMM PRECIADO AH: Reanna khanna Soxin, waaxda luqadaha, qaybta kaalmada doc, ho ward. [facility] [phone].    ATENCIÓN: Si habla español, darion a fraser disposición  servicios gratuitos de asistencia lingüística. Mabel gordon [phone].    We comply with applicable federal civil rights laws and [place] laws. We do not discriminate on the basis of race, color, national origin, age, disability, sex, sexual orientation, or gender identity.            Thank you!     Thank you for choosing [facility]  for your care. Our goal is always to provide you with excellent care. Hearing back from our patients is one way we can continue to improve our services. Please take a few minutes to complete the written survey that you may receive in the mail after your visit with us. Thank you!             Your Updated Medication List - Protect others around you: Learn how to safely use, store and throw away your medicines at www.disposemymeds.org.          This list is accurate as of: 12/11/17  9:54 PM.  Always use your most recent med list.                   Brand Name Dispense Instructions for use Diagnosis    clobetasol 0.05 % ointment    TEMOVATE    60 g    Apply sparingly to affected area twice daily as needed.  Do not apply to face.    Psoriasis       cyclobenzaprine 5 MG tablet    FLEXERIL    30 tablet    Take 1-2 tabs as needed at bedtime for pain interrupting sleep    Pain of left lower extremity, Muscle tightness       Salicylic Acid 40 % Pads    CORN REMOVERS    4 each    Externally apply 1 each topically daily    Callus of foot

## 2017-12-11 NOTE — LETTER
12/11/2017         RE: León Myers  [address] [place] [address]        Dear Colleague,    Thank you for referring your patient, León Myers, to the [facility]. Please see a copy of my visit note below.    HPI   [facility]   Follow-up Visit s Dec 11, 2017    PCP: Eusebio Farnsworth      Subjective:  León is a 37 year old male who is seen in follow up for evaluation of   Chief Complaint   Patient presents with     Follow Up For     MRI     His last visit was on 12/4/2017.  Since that time, symptoms have been the same. Patient reports no treatment since last visit.     Patient's past medical, surgical, social and family histories are reviewed today.    León Myers is accompanied today by .     History from previous visit on 12/4/2017  Injury: Reports insidious onset without acute precipitating event.    Location of Pain: left lateral ankle, posterior and anterior hip  Duration of Pain:  2 years, worse in last 2 month(s)  Rating of Pain at worst: 9/10  Rating of Pain Currently: 6/10  Pain is worse with: walking, bending and standing  Pain is better with: resting and sitting  Treatment so far consists of: ice, heat and ibuprofen  Associated symptoms: weakness of left leg, intermittent tingling and numbness  Prior History of related problems: none    Social History: works at [employer]     Past Medical History:   Diagnosis Date     Body aches 6/26/2014     Ear pain 6/26/2014     TB (tuberculosis), treated      He was treated for TB in 1991 in [facility]        Patient Active Problem List    Diagnosis Date Noted     Pain of left lower extremity 12/11/2017     Priority: Medium     Body aches 06/26/2014     Priority: Medium     Neck pain 06/18/2014     Priority: Medium     Esophageal reflux 06/18/2014     Priority: Medium     H. pylori infection 06/18/2014     Priority: Medium       Family History   Problem Relation Age  of Onset     DIABETES No family hx of      C.A.D. No family hx of        Social History     Social History     Marital status:      Spouse name: N/A     Number of children: N/A     Years of education: N/A     Social History Main Topics     Smoking status: Current Every Day Smoker     Packs/day: 0.50     Types: Cigarettes     Smokeless tobacco: Never Used     Past Surgical History:   Procedure Laterality Date     NO HISTORY OF SURGERY         Review of Systems   Musculoskeletal: Positive for back pain, joint pain and myalgias.   Neurological: Positive for tingling and sensory change. Negative for focal weakness.   All other systems reviewed and are negative.        Physical Exam  /70  Ht 6' (1.829 m)  Wt 191 lb (86.6 kg)  BMI 25.9 kg/m2  Constitutional:well-developed, well-nourished, and in no distress.   Cardiovascular: Intact distal pulses.    Neurological: alert. Gait Normal:   Gait, station, stance, and balance appear normal for age  Skin: Skin is warm and dry.   Psychiatric: Mood and affect normal.   Respiratory: unlabored, speaks in full sentences  Lymph: no LAD, no lymphangitis      Left Ankle Exam   Swelling: None.    Tenderness   None    Range of Motion   Dorsiflexion:     Normal  Plantar flexion: Normal  Inversion:         Normal  Eversion:         Normal    Muscle Strength   Dorsiflexion:          5/5  Plantar flexion:      5/5  Anterior tibial:        5/5  Posterior tibial:      5/5  Gastrosoleus:       5/5  Peroneal muscle: 5/5    Tests   Anterior drawer: Negative.  Varus tilt: Negative.    Left Hip Exam   Gait: Normal.    Tenderness   None    Range of Motion   Extension:            Normal  Flexion:                 Normal  Internal Rotation:  Normal  External Rotation: Normal  Abduction:            Normal  Adduction:            Normal    Muscle Strength   Abduction:  5/5  Adduction:  5/5  Flexion:      4/5    Tests   Solis: Negative  Arnold:  N/t    Right Hip Exam   Gait: Normal.    Back  Exam   Sensation: Normal.  Gait: Normal.    Tenderness   None    Range of Motion   Flexion:                    Abnormal  Extension:                Abnormal  Lateral Bend Left:    Normal  Lateral Bend Right:  Normal  Rotation Right:         Normal  Rotation Left:           Normal  SLR    Right: Negative  Left:    + at 70 deg    Muscle Strength   Normal back strength    Tests   Toe Walk: Normal  Heel Walk: Normal    Reflexes   Patellar:  Normal  Achilles:  Normal        EXAM: MRI OF THE LUMBAR SPINE WITHOUT CONTRAST  CLINICAL INFORMATION: Male, age 37 years, with 6 weeks lumbosacral and left buttock pain, intermittent ankle numbness. No known injury or lumbar spine surgery.  TECHNICAL INFORMATION: Short TR/short TE, long TR/long TE, and STIR sagittal, coronal long TR/long TE, and short TR/short TE and long TR/long TE axial non-contrast images.  COMPARISON: There are no comparison lumbar spine studies available for review.  INTERPRETATION:  Overview: Normal lordotic curvature. Normal vertebral body heights. Normal marrow signal. No spondylolysis.  Cord: Lower thoracic spinal cord imaged has normal contour and signal. Conus medullaris terminates normally at the L1 level.  Discs: Disc dehydration (long TR) is mild at all levels and there is minimal L5-S1 and L4-L5 posterior disc bulging, no disc herniation or annular fissuring present.  Facet joints: Normal.  Foramina: Normal.  Ancillary: Paraspinous musculature, prevertebral soft tissues, and hip and SI joints appear normal.  CONCLUSION:  The lumbar spine appears within the range of normal for age; no fracture, neoplasm, disc herniation, or infection.  HANG  Electronically signed on 12/7/2017 12:36:00 PM by Bora Norris M.D.           ASSESSMENT/PLAN    ICD-10-CM    1. Pain of left lower extremity M79.605 cyclobenzaprine (FLEXERIL) 5 MG tablet   2. Muscle tightness M62.89 cyclobenzaprine (FLEXERIL) 5 MG tablet     lengthy discussion regarding nature of LLE diffuse  soreness. No MRI findings to correlate with radiculopahty. Reviewed nature of work, standing 12 hours daily. No other exercises or activity     Suspect diffuse muscle soreness from disuse, tightness.     Educated on hip and low back muscle stretching. Advised on daily cardio exercises. F/u as needed. Ok for one time rx flexeril for soreness affecting work at end of day.       Again, thank you for allowing me to participate in the care of your patient.        Sincerely,        Romario Moreira MD

## 2017-12-12 ENCOUNTER — OFFICE VISIT (OUTPATIENT)
Dept: PODIATRY | Facility: CLINIC | Age: 37
End: 2017-12-12
Attending: PHYSICIAN ASSISTANT
Payer: COMMERCIAL

## 2017-12-12 VITALS
HEIGHT: 72 IN | WEIGHT: 191 LBS | DIASTOLIC BLOOD PRESSURE: 78 MMHG | SYSTOLIC BLOOD PRESSURE: 122 MMHG | BODY MASS INDEX: 25.87 KG/M2 | HEART RATE: 105 BPM

## 2017-12-12 DIAGNOSIS — L84 CALLUS: Primary | ICD-10-CM

## 2017-12-12 DIAGNOSIS — M20.60 DEFORMITY OF TOE, UNSPECIFIED LATERALITY: ICD-10-CM

## 2017-12-12 PROCEDURE — 99243 OFF/OP CNSLTJ NEW/EST LOW 30: CPT | Mod: 25 | Performed by: PODIATRIST

## 2017-12-12 PROCEDURE — 11056 PARNG/CUTG B9 HYPRKR LES 2-4: CPT | Performed by: PODIATRIST

## 2017-12-12 NOTE — PROGRESS NOTES
Weight management plan: Patient was referred to their PCP to discuss a diet and exercise plan.     MARILOU Luo MA December 12, 2017 4:10 PM

## 2017-12-12 NOTE — PATIENT INSTRUCTIONS
Thank you for choosing [facility]!    Follow up as needed.    DR[facility] LOCATIONS     MONDAY  [place] TUESDAY & FRIDAY AM  BOB   [address] [facility]   [address]   [place] [address] [place] [address]   [phone]  [phone]  [phone]       WEDNESDAY  [place] SCHEDULE SURGERY: [phone]   [address] [place] [address] APPOINTMENTS: [phone]   [place] [address] BILLING QUESTIONS: [phone]   [phone]         Calluses, Corns, IPKs, Porokeratosis    When there is excessive friction or pressure on the skin, the body responds by making the skin thicker to protect the deeper structures from becoming exposed.  While this works well to protect the deeper structures, the thickened skin can increase pressure and pain.    Flat, diffuse thickening are simple calluses and they are usually caused by friction.  Often these are the result of rubbing on a shoe or going barefoot.    Calluses with a central core between the toes are called corns.  These result from prominent joints on adjacent toes rubbing together.  Theses are a symptom of bone malalignment and will always recur unless the underlying bones are addressed surgically.    Calluses with a central core on the ball of the foot are usually IPKs (intractable plantar keratosis).  These are caused by excessive pressure from the metatarsals, the bones that make up the ball of the foot.  Often one of these bones is too long or too prominent.  Again, these will always recur unless the underlying bone issue is addressed.  There is no cure for these.  They will either go away by themselves, recur, or more could develop.    Regardless of the diagnosis, most of these lesions can be kept comfortable with routine maintenance.   1.This consists of filing them with a pumice stone or callus file a couple of times per week.    2. Lotion can be applied to soften the  callus. A urea based cream such as Kersal or Vanicream or thicker cream with shea butter are good options.  3. Toe spacers or toe covers can be used for corns, gel pads can be used for other lesions on the bottom of the foot.   If there is a surgical pathology noted, such as a prominent bone, often this needs to be addressed surgically to minimize recurrence. However, sometimes the lesion simply migrates to another spot after surgery, so it is not a guaranteed cure.     Please call with any additional questions.     Hammertoe           What Is Hammertoe?  Hammertoe is a contracture (bending) of one or both joints of the second, third, fourth, or fifth (little) toes. This abnormal bending can put pressure on the toe when wearing shoes, causing problems to develop.  Hammertoes usually start out as mild deformities and get progressively worse over time. In the earlier stages, hammertoes are flexible and the symptoms can often be managed with noninvasive measures. But if left untreated, hammertoes can become more rigid and will not respond to non-surgical treatment.  Because of the progressive nature of hammertoes, they should receive early attention. Hammertoes never get better without some kind of intervention.  Causes  The most common cause of hammertoe is a muscle/tendon imbalance. This imbalance, which leads to a bending of the toe, results from mechanical (structural) changes in the foot that occur over time in some people.  Hammertoes may be aggravated by shoes that don t fit properly. A hammertoe may result if a toe is too long and is forced into a cramped position when a tight shoe is worn.  Occasionally, hammertoe is the result of an earlier trauma to the toe. In some people, hammertoes are inherited.  Symptoms  Common symptoms of hammertoes include:  Pain or irritation of the affected toe when wearing shoes.   Corns and calluses (a buildup of skin) on the toe, between two toes, or on the ball of the foot.  Corns are caused by constant friction against the shoe. They may be soft or hard, depending upon their location.   Inflammation, redness, or a burning sensation   Contracture of the toe   In more severe cases of hammertoe, open sores may form.   Diagnosis  Although hammertoes are readily apparent, to arrive at a diagnosis the foot and ankle surgeon will obtain a thorough history of your symptoms and examine your foot. During the physical examination, the doctor may attempt to reproduce your symptoms by manipulating your foot and will study the contractures of the toes. In addition, the foot and ankle surgeon may take x-rays to determine the degree of the deformities and assess any changes that may have occurred.   Hammertoes are progressive   they don t go away by themselves and usually they will get worse over time. However, not all cases are alike   some hammertoes progress more rapidly than others. Once your foot and ankle surgeon has evaluated your hammertoes, a treatment plan can be developed that is suited to your needs.  Non-surgical Treatment  There is a variety of treatment options for hammertoe. The treatment your foot and ankle surgeon selects will depend upon the severity of your hammertoe and other factors.  A number of non-surgical measures can be undertaken:  Padding corns and calluses. Your foot and ankle surgeon can provide or prescribe pads designed to shield corns from irritation. If you want to try over-the-counter pads, avoid the medicated types. Medicated pads are generally not recommended because they may contain a small amount of acid that can be harmful. Consult your surgeon about this option.   Changes in shoewear. Avoid shoes with pointed toes, shoes that are too short, or shoes with high heels   conditions that can force your toe against the front of the shoe. Instead, choose comfortable shoes with a deep, roomy toe box and heels no higher than two inches.   Orthotic devices. A custom  orthotic device placed in your shoe may help control the muscle/tendon imbalance.   Injection therapy. Corticosteroid injections are sometimes used to ease pain and inflammation caused by hammertoe.   Medications. Oral nonsteroidal anti-inflammatory drugs (NSAIDs), such as ibuprofen, may be recommended to reduce pain and inflammation.   Splinting/strapping. Splints or small straps may be applied by the surgeon to realign the bent toe.       HAMMERTOE SURGERY   Hammertoe surgery is complex. The surgical procedure is an attempt to help the toe lay in a better position. Nearly every structure in the toe will be cut including the tendons, ligaments, skin and bone. Hammertoes are a complex deformity and final toe position is difficult to predict. The only sure way to position a toe is to fuse all three digital joints. That will not happen as some degree of toe motion is needed for walking. The toe may not be completely reduced as the surrounding skin and other structures may not allow the toe to return to a normal position. The tendons on adjacent toes may need to be cut at the time of the original or subsequent surgeries, as interconnections exist between the toes. The toe may drift after surgery. Stiffness may develop leading to new areas of pressure.   Future shoe choices will be critical in allowing the surgery to provide comfort. The toes will still hurt if shoes rub. The original pain may also persist as other foot problems may be contributing to the current pain. The toe may or may not be pinned in place. External pins would require complete avoidance of water on the foot for six weeks. The pin would be removed about six weeks after the surgery. Strict attention to protection is critical. The pin could get bumped or loosen resulting in early removal. Removal might be necessary before the bone heals which would negatively affect the final surgical outcome and toe alignment.         Body Mass Index (BMI)  Many  things can cause foot and ankle problems. Foot structure, activity level, foot mechanics and injuries are common causes of pain.  One very important issue that often goes unmentioned, is body weight. Extra weight can cause increased stress on muscles, ligaments, bones and tendons.  Sometimes just a few extra pounds is all it takes to put one over her/his threshold. Without reducing that stress, it can be difficult to alleviate pain. Some people are uncomfortable addressing this issue, but we feel it is important for you to think about it. As Foot &  Ankle specialists, our job is addressing the lower extremity problem and possible causes. Regarding extra body weight, we encourage patients to discuss diet and weight management plans with their primary care doctors. It is this team approach that gives you the best opportunity for pain relief and getting you back on your feet.

## 2017-12-12 NOTE — LETTER
12/12/2017         RE: León Myers  [address] [place] [address]        Dear Colleague,    Thank you for referring your patient, León Myers, to the [facility]. Please see a copy of my visit note below.    Weight management plan: Patient was referred to their PCP to discuss a diet and exercise plan.     MARILOU Luo MA December 12, 2017 4:10 PM      PATIENT HISTORY:  León Myers is a 37 year old male who presents to clinic for left foot painful calluses.  I was requested to see this patient for this issue by Evan Bedolla PA-C.  2 year duration.  Pt has tried trimming, acid pads.  Minimal improvement.  8-9/10 pain with pressure.  Mostly on 5th toe, but also between left 1st and 2nd toes.   present.    Review of Systems:  Rest of 10 pt ROS neg except for HPI.       PAST MEDICAL HISTORY:   Past Medical History:   Diagnosis Date     Body aches 6/26/2014     Ear pain 6/26/2014     TB (tuberculosis), treated      He was treated for TB in 1991 in [facility]         PAST SURGICAL HISTORY:   Past Surgical History:   Procedure Laterality Date     NO HISTORY OF SURGERY          MEDICATIONS:   Current Outpatient Prescriptions:      cyclobenzaprine (FLEXERIL) 5 MG tablet, Take 1-2 tabs as needed at bedtime for pain interrupting sleep, Disp: 30 tablet, Rfl: 0     Salicylic Acid (CORN REMOVERS) 40 % PADS, Externally apply 1 each topically daily (Patient not taking: Reported on 12/4/2017), Disp: 4 each, Rfl: 0     clobetasol (TEMOVATE) 0.05 % ointment, Apply sparingly to affected area twice daily as needed.  Do not apply to face. (Patient not taking: Reported on 12/4/2017), Disp: 60 g, Rfl: 3     ALLERGIES:    Allergies   Allergen Reactions     Nka [No Known Allergies]         SOCIAL HISTORY:   Social History     Social History     Marital status:      Spouse name: N/A     Number of children: N/A     Years of education: N/A     Occupational History     Not on file.     Social  History Main Topics     Smoking status: Current Every Day Smoker     Packs/day: 0.50     Types: Cigarettes     Smokeless tobacco: Never Used     Alcohol use No     Drug use: No     Sexual activity: Yes     Other Topics Concern     Not on file     Social History Narrative    , 2 kids, smoker 4cig / day, alcohol none, working ful time assembly         FAMILY HISTORY:   Family History   Problem Relation Age of Onset     DIABETES No family hx of      C.A.D. No family hx of         EXAM:Vitals: /78  Pulse 105  Ht 6' (1.829 m)  Wt 191 lb (86.6 kg)  BMI 25.9 kg/m2  BMI= Body mass index is 25.9 kg/(m^2).    General appearance: Patient is alert and fully cooperative with history & exam.  No sign of distress is noted during the visit.     Psychiatric: Affect is pleasant & appropriate.  Patient appears motivated to improve health.     Respiratory: Breathing is regular & unlabored while sitting.     HEENT: Hearing is intact to spoken word.  Speech is clear.  No gross evidence of visual impairment that would impact ambulation.     Dermatologic: Painful hyperkeratotic lesions dorsolateral left 5th toe x2, over PIPJ and just lateral to the nail.  Diffuse callusing on 1st and 2nd toes interdigitally.  Mild callusing near nail on right 5th toe, but not painful.  No wounds.  No paronychia or evidence of soft tissue infection is noted.     Vascular: DP & PT pulses are intact & regular bilaterally.  No significant edema or varicosities noted.  CFT and skin temperature are normal to both lower extremities.     Neurologic: Lower extremity sensation is intact to light touch.  No evidence of weakness or contracture in the lower extremities.  No evidence of neuropathy.     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  L 1st and 2nd toes do not touch upon standing. Patient is ambulatory without assistive device or brace.  No gross ankle deformity noted.  No foot or ankle joint effusion is noted.     ASSESSMENT: L  foot painful calluses, associated toe deformity     PLAN:  Reviewed patient's chart in epic.  Discussed condition and treatment options including pros and cons.    Discussed causes of calluses  They are due to areas of increased friction/pressure.  Toe deformity is contributory.  Discussed treatments such as using foot file, pumice stone, pads, orthotics, and not walking barefoot.      Pt requested debridement today.  I debrided the left 5th toe lesions x2.  Not enough callusing to merit debridement between L 1st and 2nd toes.    Wider shoes advised.      Discussed possible toe surgery such as toe arthroplasty with derotational skin plasty.  May also consider removal of any contributory bone on 1st and 2nd toes.      Pt will try wider shoes, orthotics.  Otc options discussed.  They declined custom referral for now.  Toe spacers given for 1st interspace.  If conservative care fails, surgery remains an option.  F/u prn.    Paul Mcdowell DPM, FACFAS      Again, thank you for allowing me to participate in the care of your patient.        Sincerely,        Paul Mcdowell DPM

## 2017-12-12 NOTE — NURSING NOTE
Chief Complaint   Patient presents with     Toe Pain     L foot- pain between 1st and 2nd toes and on 5th toe        Initial /78  Pulse 105  Ht 6' (1.829 m)  Wt 191 lb (86.6 kg)  BMI 25.9 kg/m2 Estimated body mass index is 25.9 kg/(m^2) as calculated from the following:    Height as of this encounter: 6' (1.829 m).    Weight as of this encounter: 191 lb (86.6 kg).  Medication Reconciliation: meggan Luo MA December 12, 2017 4:10 PM

## 2017-12-12 NOTE — MR AVS SNAPSHOT
After Visit Summary   12/12/2017    León Myers    MRN: [id number]           Patient Information     Date Of Birth          1980        Visit Information        Provider Department      12/12/2017 4:00 PM Paul Duncan DPM; LANGUAGE [facility]        Today's Diagnoses     Callus    -  1    Deformity of toe, unspecified laterality          Care Instructions    Thank you for choosing [facility]!    Follow up as needed.    DR. DUNCAN'S CLINIC LOCATIONS     MONDAY  [place] TUESDAY & FRIDAY AM  [place]   [address] [facility]   [address] [place] [address] [address]   [place] [address] [place] [address]   [phone]  [phone]  [phone]       WEDNESDAY  [place] SCHEDULE SURGERY: [phone]   [address] [facility] APPOINTMENTS: [phone]   [place] [address] BILLING QUESTIONS: [phone]   [phone]         Calluses, Corns, IPKs, Porokeratosis    When there is excessive friction or pressure on the skin, the body responds by making the skin thicker to protect the deeper structures from becoming exposed.  While this works well to protect the deeper structures, the thickened skin can increase pressure and pain.    Flat, diffuse thickening are simple calluses and they are usually caused by friction.  Often these are the result of rubbing on a shoe or going barefoot.    Calluses with a central core between the toes are called corns.  These result from prominent joints on adjacent toes rubbing together.  Theses are a symptom of bone malalignment and will always recur unless the underlying bones are addressed surgically.    Calluses with a central core on the ball of the foot are usually IPKs (intractable plantar keratosis).  These are caused by excessive pressure from the metatarsals, the bones that make up the ball of the foot.  Often one of these bones is too long or too prominent.  Again,  these will always recur unless the underlying bone issue is addressed.  There is no cure for these.  They will either go away by themselves, recur, or more could develop.    Regardless of the diagnosis, most of these lesions can be kept comfortable with routine maintenance.   1.This consists of filing them with a pumice stone or callus file a couple of times per week.    2. Lotion can be applied to soften the callus. A urea based cream such as Kersal or Vanicream or thicker cream with shea butter are good options.  3. Toe spacers or toe covers can be used for corns, gel pads can be used for other lesions on the bottom of the foot.   If there is a surgical pathology noted, such as a prominent bone, often this needs to be addressed surgically to minimize recurrence. However, sometimes the lesion simply migrates to another spot after surgery, so it is not a guaranteed cure.     Please call with any additional questions.     Hammertoe           What Is Hammertoe?  Hammertoe is a contracture (bending) of one or both joints of the second, third, fourth, or fifth (little) toes. This abnormal bending can put pressure on the toe when wearing shoes, causing problems to develop.  Hammertoes usually start out as mild deformities and get progressively worse over time. In the earlier stages, hammertoes are flexible and the symptoms can often be managed with noninvasive measures. But if left untreated, hammertoes can become more rigid and will not respond to non-surgical treatment.  Because of the progressive nature of hammertoes, they should receive early attention. Hammertoes never get better without some kind of intervention.  Causes  The most common cause of hammertoe is a muscle/tendon imbalance. This imbalance, which leads to a bending of the toe, results from mechanical (structural) changes in the foot that occur over time in some people.  Hammertoes may be aggravated by shoes that don t fit properly. A hammertoe may result  if a toe is too long and is forced into a cramped position when a tight shoe is worn.  Occasionally, hammertoe is the result of an earlier trauma to the toe. In some people, hammertoes are inherited.  Symptoms  Common symptoms of hammertoes include:  Pain or irritation of the affected toe when wearing shoes.   Corns and calluses (a buildup of skin) on the toe, between two toes, or on the ball of the foot. Corns are caused by constant friction against the shoe. They may be soft or hard, depending upon their location.   Inflammation, redness, or a burning sensation   Contracture of the toe   In more severe cases of hammertoe, open sores may form.   Diagnosis  Although hammertoes are readily apparent, to arrive at a diagnosis the foot and ankle surgeon will obtain a thorough history of your symptoms and examine your foot. During the physical examination, the doctor may attempt to reproduce your symptoms by manipulating your foot and will study the contractures of the toes. In addition, the foot and ankle surgeon may take x-rays to determine the degree of the deformities and assess any changes that may have occurred.   Hammertoes are progressive - they don t go away by themselves and usually they will get worse over time. However, not all cases are alike - some hammertoes progress more rapidly than others. Once your foot and ankle surgeon has evaluated your hammertoes, a treatment plan can be developed that is suited to your needs.  Non-surgical Treatment  There is a variety of treatment options for hammertoe. The treatment your foot and ankle surgeon selects will depend upon the severity of your hammertoe and other factors.  A number of non-surgical measures can be undertaken:  Padding corns and calluses. Your foot and ankle surgeon can provide or prescribe pads designed to shield corns from irritation. If you want to try over-the-counter pads, avoid the medicated types. Medicated pads are generally not recommended  because they may contain a small amount of acid that can be harmful. Consult your surgeon about this option.   Changes in shoewear. Avoid shoes with pointed toes, shoes that are too short, or shoes with high heels - conditions that can force your toe against the front of the shoe. Instead, choose comfortable shoes with a deep, roomy toe box and heels no higher than two inches.   Orthotic devices. A custom orthotic device placed in your shoe may help control the muscle/tendon imbalance.   Injection therapy. Corticosteroid injections are sometimes used to ease pain and inflammation caused by hammertoe.   Medications. Oral nonsteroidal anti-inflammatory drugs (NSAIDs), such as ibuprofen, may be recommended to reduce pain and inflammation.   Splinting/strapping. Splints or small straps may be applied by the surgeon to realign the bent toe.       HAMMERTOE SURGERY   Hammertoe surgery is complex. The surgical procedure is an attempt to help the toe lay in a better position. Nearly every structure in the toe will be cut including the tendons, ligaments, skin and bone. Hammertoes are a complex deformity and final toe position is difficult to predict. The only sure way to position a toe is to fuse all three digital joints. That will not happen as some degree of toe motion is needed for walking. The toe may not be completely reduced as the surrounding skin and other structures may not allow the toe to return to a normal position. The tendons on adjacent toes may need to be cut at the time of the original or subsequent surgeries, as interconnections exist between the toes. The toe may drift after surgery. Stiffness may develop leading to new areas of pressure.   Future shoe choices will be critical in allowing the surgery to provide comfort. The toes will still hurt if shoes rub. The original pain may also persist as other foot problems may be contributing to the current pain. The toe may or may not be pinned in place.  External pins would require complete avoidance of water on the foot for six weeks. The pin would be removed about six weeks after the surgery. Strict attention to protection is critical. The pin could get bumped or loosen resulting in early removal. Removal might be necessary before the bone heals which would negatively affect the final surgical outcome and toe alignment.         Body Mass Index (BMI)  Many things can cause foot and ankle problems. Foot structure, activity level, foot mechanics and injuries are common causes of pain.  One very important issue that often goes unmentioned, is body weight. Extra weight can cause increased stress on muscles, ligaments, bones and tendons.  Sometimes just a few extra pounds is all it takes to put one over her/his threshold. Without reducing that stress, it can be difficult to alleviate pain. Some people are uncomfortable addressing this issue, but we feel it is important for you to think about it. As Foot &  Ankle specialists, our job is addressing the lower extremity problem and possible causes. Regarding extra body weight, we encourage patients to discuss diet and weight management plans with their primary care doctors. It is this team approach that gives you the best opportunity for pain relief and getting you back on your feet.              Follow-ups after your visit        Who to contact     If you have questions or need follow up information about today's clinic visit or your schedule please contact [facility] directly at [phone].  Normal or non-critical lab and imaging results will be communicated to you by MyChart, letter or phone within 4 business days after the clinic has received the results. If you do not hear from us within 7 days, please contact the clinic through MyChart or phone. If you have a critical or abnormal lab result, we will notify you by phone as soon as possible.  Submit refill requests through [employer] or call your pharmacy and  "they will forward the refill request to us. Please allow 3 business days for your refill to be completed.          Additional Information About Your Visit        [employer] Information     TVTY lets you send messages to your doctor, view your test results, renew your prescriptions, schedule appointments and more. To sign up, go to [email or web address] . Click on \"Log in\" on the left side of the screen, which will take you to the Welcome page. Then click on \"Sign up Now\" on the right side of the page.     You will be asked to enter the access code listed below, as well as some personal information. Please follow the directions to create your username and password.     Your access code is: [email or web address]  Expires: 2017  9:04 AM     Your access code will  in 90 days. If you need help or a new code, please call your [facility] clinic or [phone].        Care EveryWhere ID     This is your Care EveryWhere ID. This could be used by other organizations to access your [facility] medical records  [id number]        Your Vitals Were     Pulse Height BMI (Body Mass Index)             105 6' (1.829 m) 25.9 kg/m2          Blood Pressure from Last 3 Encounters:   17 122/78   17 110/70   17 110/70    Weight from Last 3 Encounters:   17 191 lb (86.6 kg)   17 191 lb (86.6 kg)   17 191 lb (86.6 kg)              Today, you had the following     No orders found for display       Primary Care Provider Office Phone # Fax #    Eusebio Farnsworth [phone]       [address] [place] [address]        Equal Access to Services     [facility] : Reanna Mariee, mak castro, mulugeta roach, ho ward. So [facility] [phone].    ATENCIÓN: Si habla español, tiene a fraser disposición servicios gratuitos de asistencia lingüística. Llfernanda al [phone].    We comply with applicable federal civil rights laws and " [place] laws. We do not discriminate on the basis of race, color, national origin, age, disability, sex, sexual orientation, or gender identity.            Thank you!     Thank you for choosing [facility]  for your care. Our goal is always to provide you with excellent care. Hearing back from our patients is one way we can continue to improve our services. Please take a few minutes to complete the written survey that you may receive in the mail after your visit with us. Thank you!             Your Updated Medication List - Protect others around you: Learn how to safely use, store and throw away your medicines at www.disposemymeds.org.          This list is accurate as of: 12/12/17  4:27 PM.  Always use your most recent med list.                   Brand Name Dispense Instructions for use Diagnosis    clobetasol 0.05 % ointment    TEMOVATE    60 g    Apply sparingly to affected area twice daily as needed.  Do not apply to face.    Psoriasis       cyclobenzaprine 5 MG tablet    FLEXERIL    30 tablet    Take 1-2 tabs as needed at bedtime for pain interrupting sleep    Pain of left lower extremity, Muscle tightness       Salicylic Acid 40 % Pads    CORN REMOVERS    4 each    Externally apply 1 each topically daily    Callus of foot

## 2017-12-12 NOTE — PROGRESS NOTES
PATIENT HISTORY:  León Myers is a 37 year old male who presents to clinic for left foot painful calluses.  I was requested to see this patient for this issue by Evan Bedolla PA-C.  2 year duration.  Pt has tried trimming, acid pads.  Minimal improvement.  8-9/10 pain with pressure.  Mostly on 5th toe, but also between left 1st and 2nd toes.   present.    Review of Systems:  Rest of 10 pt ROS neg except for HPI.       PAST MEDICAL HISTORY:   Past Medical History:   Diagnosis Date     Body aches 6/26/2014     Ear pain 6/26/2014     TB (tuberculosis), treated      He was treated for TB in 1991 in [facility]         PAST SURGICAL HISTORY:   Past Surgical History:   Procedure Laterality Date     NO HISTORY OF SURGERY          MEDICATIONS:   Current Outpatient Prescriptions:      cyclobenzaprine (FLEXERIL) 5 MG tablet, Take 1-2 tabs as needed at bedtime for pain interrupting sleep, Disp: 30 tablet, Rfl: 0     Salicylic Acid (CORN REMOVERS) 40 % PADS, Externally apply 1 each topically daily (Patient not taking: Reported on 12/4/2017), Disp: 4 each, Rfl: 0     clobetasol (TEMOVATE) 0.05 % ointment, Apply sparingly to affected area twice daily as needed.  Do not apply to face. (Patient not taking: Reported on 12/4/2017), Disp: 60 g, Rfl: 3     ALLERGIES:    Allergies   Allergen Reactions     Nka [No Known Allergies]         SOCIAL HISTORY:   Social History     Social History     Marital status:      Spouse name: N/A     Number of children: N/A     Years of education: N/A     Occupational History     Not on file.     Social History Main Topics     Smoking status: Current Every Day Smoker     Packs/day: 0.50     Types: Cigarettes     Smokeless tobacco: Never Used     Alcohol use No     Drug use: No     Sexual activity: Yes     Other Topics Concern     Not on file     Social History Narrative    , 2 kids, smoker 4cig / day, alcohol none, working ful time assembly         FAMILY HISTORY:   Family  History   Problem Relation Age of Onset     DIABETES No family hx of      C.A.D. No family hx of         EXAM:Vitals: /78  Pulse 105  Ht 6' (1.829 m)  Wt 191 lb (86.6 kg)  BMI 25.9 kg/m2  BMI= Body mass index is 25.9 kg/(m^2).    General appearance: Patient is alert and fully cooperative with history & exam.  No sign of distress is noted during the visit.     Psychiatric: Affect is pleasant & appropriate.  Patient appears motivated to improve health.     Respiratory: Breathing is regular & unlabored while sitting.     HEENT: Hearing is intact to spoken word.  Speech is clear.  No gross evidence of visual impairment that would impact ambulation.     Dermatologic: Painful hyperkeratotic lesions dorsolateral left 5th toe x2, over PIPJ and just lateral to the nail.  Diffuse callusing on 1st and 2nd toes interdigitally.  Mild callusing near nail on right 5th toe, but not painful.  No wounds.  No paronychia or evidence of soft tissue infection is noted.     Vascular: DP & PT pulses are intact & regular bilaterally.  No significant edema or varicosities noted.  CFT and skin temperature are normal to both lower extremities.     Neurologic: Lower extremity sensation is intact to light touch.  No evidence of weakness or contracture in the lower extremities.  No evidence of neuropathy.     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  L 1st and 2nd toes do not touch upon standing. Patient is ambulatory without assistive device or brace.  No gross ankle deformity noted.  No foot or ankle joint effusion is noted.     ASSESSMENT: L foot painful calluses, associated toe deformity     PLAN:  Reviewed patient's chart in epic.  Discussed condition and treatment options including pros and cons.    Discussed causes of calluses  They are due to areas of increased friction/pressure.  Toe deformity is contributory.  Discussed treatments such as using foot file, pumice stone, pads, orthotics, and not walking barefoot.       Pt requested debridement today.  I debrided the left 5th toe lesions x2.  Not enough callusing to merit debridement between L 1st and 2nd toes.    Wider shoes advised.      Discussed possible toe surgery such as toe arthroplasty with derotational skin plasty.  May also consider removal of any contributory bone on 1st and 2nd toes.      Pt will try wider shoes, orthotics.  Otc options discussed.  They declined custom referral for now.  Toe spacers given for 1st interspace.  If conservative care fails, surgery remains an option.  F/u prn.    Paul Mcdowell DPM, FACFAS

## 2018-02-02 ENCOUNTER — RADIANT APPOINTMENT (OUTPATIENT)
Dept: GENERAL RADIOLOGY | Facility: CLINIC | Age: 38
End: 2018-02-02
Attending: PODIATRIST
Payer: MEDICAID

## 2018-02-02 ENCOUNTER — OFFICE VISIT (OUTPATIENT)
Dept: PODIATRY | Facility: CLINIC | Age: 38
End: 2018-02-02
Payer: MEDICAID

## 2018-02-02 DIAGNOSIS — L84 CALLUS: ICD-10-CM

## 2018-02-02 DIAGNOSIS — M20.41 HAMMER TOES OF BOTH FEET: ICD-10-CM

## 2018-02-02 DIAGNOSIS — M20.42 HAMMER TOES OF BOTH FEET: ICD-10-CM

## 2018-02-02 DIAGNOSIS — M20.41 HAMMER TOES OF BOTH FEET: Primary | ICD-10-CM

## 2018-02-02 DIAGNOSIS — M20.42 HAMMER TOES OF BOTH FEET: Primary | ICD-10-CM

## 2018-02-02 PROCEDURE — 99214 OFFICE O/P EST MOD 30 MIN: CPT | Performed by: PODIATRIST

## 2018-02-02 PROCEDURE — 73630 X-RAY EXAM OF FOOT: CPT | Mod: RT

## 2018-02-02 NOTE — PATIENT INSTRUCTIONS
"Surgical planning.  If you have decided to have surgery, follow these few steps to get the procedure scheduled and to have the proper paperwork filled out.  If you are unsure about surgery, or would like to sit down and further discuss your issue and treatment options, please make a clinic appointment with Dr Mcdowell.    1.  Pick the date that you would like to have surgery.  Keep in mind that you will likely need at least 2 weeks off after the procedure for proper rest and healing.    2.  Call the surgery scheduling line at [phone] to get the procedure scheduled.    3.  Make an appointment to see Dr Mcdowell within 1 week of the date of surgery for your pre-operative consult.  When making the appointment, say \"I need to make a 40 minute surgical consult with Dr Mcdowell\".  It is recommended that you bring a spouse, family member or friend with you.  There will be lots of information presented.  It can be overwhelming, and it is better to have someone there to help sort out the details.    4.  Make an appointment to see your Primary Care Physician within 4 weeks of the date of surgery for your \"Pre-operative History and Physical\".  This is done to make sure you are medically healthy to undergo surgery.    If you have any post-operative questions regarding your procedure, call our triage RNs at [phone].        Calluses, Corns, IPKs, Porokeratosis    When there is excessive friction or pressure on the skin, the body responds by making the skin thicker to protect the deeper structures from becoming exposed.  While this works well to protect the deeper structures, the thickened skin can increase pressure and pain.    Flat, diffuse thickening are simple calluses and they are usually caused by friction.  Often these are the result of rubbing on a shoe or going barefoot.    Calluses with a central core between the toes are called corns.  These result from prominent joints on adjacent toes rubbing together.  " Theses are a symptom of bone malalignment and will always recur unless the underlying bones are addressed surgically.    Calluses with a central core on the ball of the foot are usually IPKs (intractable plantar keratosis).  These are caused by excessive pressure from the metatarsals, the bones that make up the ball of the foot.  Often one of these bones is too long or too prominent.  Again, these will always recur unless the underlying bone issue is addressed.  There is no cure for these.  They will either go away by themselves, recur, or more could develop.    Regardless of the diagnosis, most of these lesions can be kept comfortable with routine maintenance.   1.This consists of filing them with a pumice stone or callus file a couple of times per week.    2. Lotion can be applied to soften the callus. A urea based cream such as Kersal or Vanicream or thicker cream with shea butter are good options.  3. Toe spacers or toe covers can be used for corns, gel pads can be used for other lesions on the bottom of the foot.   If there is a surgical pathology noted, such as a prominent bone, often this needs to be addressed surgically to minimize recurrence. However, sometimes the lesion simply migrates to another spot after surgery, so it is not a guaranteed cure.     Please call with any additional questions.       Hammertoe           What Is Hammertoe?  Hammertoe is a contracture (bending) of one or both joints of the second, third, fourth, or fifth (little) toes. This abnormal bending can put pressure on the toe when wearing shoes, causing problems to develop.  Hammertoes usually start out as mild deformities and get progressively worse over time. In the earlier stages, hammertoes are flexible and the symptoms can often be managed with noninvasive measures. But if left untreated, hammertoes can become more rigid and will not respond to non-surgical treatment.  Because of the progressive nature of hammertoes, they  should receive early attention. Hammertoes never get better without some kind of intervention.  Causes  The most common cause of hammertoe is a muscle/tendon imbalance. This imbalance, which leads to a bending of the toe, results from mechanical (structural) changes in the foot that occur over time in some people.  Hammertoes may be aggravated by shoes that don t fit properly. A hammertoe may result if a toe is too long and is forced into a cramped position when a tight shoe is worn.  Occasionally, hammertoe is the result of an earlier trauma to the toe. In some people, hammertoes are inherited.  Symptoms  Common symptoms of hammertoes include:  Pain or irritation of the affected toe when wearing shoes.   Corns and calluses (a buildup of skin) on the toe, between two toes, or on the ball of the foot. Corns are caused by constant friction against the shoe. They may be soft or hard, depending upon their location.   Inflammation, redness, or a burning sensation   Contracture of the toe   In more severe cases of hammertoe, open sores may form.   Diagnosis  Although hammertoes are readily apparent, to arrive at a diagnosis the foot and ankle surgeon will obtain a thorough history of your symptoms and examine your foot. During the physical examination, the doctor may attempt to reproduce your symptoms by manipulating your foot and will study the contractures of the toes. In addition, the foot and ankle surgeon may take x-rays to determine the degree of the deformities and assess any changes that may have occurred.   Hammertoes are progressive   they don t go away by themselves and usually they will get worse over time. However, not all cases are alike   some hammertoes progress more rapidly than others. Once your foot and ankle surgeon has evaluated your hammertoes, a treatment plan can be developed that is suited to your needs.  Non-surgical Treatment  There is a variety of treatment options for hammertoe. The treatment  your foot and ankle surgeon selects will depend upon the severity of your hammertoe and other factors.  A number of non-surgical measures can be undertaken:  Padding corns and calluses. Your foot and ankle surgeon can provide or prescribe pads designed to shield corns from irritation. If you want to try over-the-counter pads, avoid the medicated types. Medicated pads are generally not recommended because they may contain a small amount of acid that can be harmful. Consult your surgeon about this option.   Changes in shoewear. Avoid shoes with pointed toes, shoes that are too short, or shoes with high heels   conditions that can force your toe against the front of the shoe. Instead, choose comfortable shoes with a deep, roomy toe box and heels no higher than two inches.   Orthotic devices. A custom orthotic device placed in your shoe may help control the muscle/tendon imbalance.   Injection therapy. Corticosteroid injections are sometimes used to ease pain and inflammation caused by hammertoe.   Medications. Oral nonsteroidal anti-inflammatory drugs (NSAIDs), such as ibuprofen, may be recommended to reduce pain and inflammation.   Splinting/strapping. Splints or small straps may be applied by the surgeon to realign the bent toe.       HAMMERTOE SURGERY   Hammertoe surgery is complex. The surgical procedure is an attempt to help the toe lay in a better position. Nearly every structure in the toe will be cut including the tendons, ligaments, skin and bone. Hammertoes are a complex deformity and final toe position is difficult to predict. The only sure way to position a toe is to fuse all three digital joints. That will not happen as some degree of toe motion is needed for walking. The toe may not be completely reduced as the surrounding skin and other structures may not allow the toe to return to a normal position. The tendons on adjacent toes may need to be cut at the time of the original or subsequent surgeries, as  interconnections exist between the toes. The toe may drift after surgery. Stiffness may develop leading to new areas of pressure.   Future shoe choices will be critical in allowing the surgery to provide comfort. The toes will still hurt if shoes rub. The original pain may also persist as other foot problems may be contributing to the current pain. The toe may or may not be pinned in place. External pins would require complete avoidance of water on the foot for six weeks. The pin would be removed about six weeks after the surgery. Strict attention to protection is critical. The pin could get bumped or loosen resulting in early removal. Removal might be necessary before the bone heals which would negatively affect the final surgical outcome and toe alignment.           SMOKING CESSATION  What's in cigarette smoke? - Cigarette smoke contains over 4,000 chemicals. Nicotine is one of the main ingredients which is an insecticide/herbicide. It is poisonous to our nervous system, increases blood clotting risk, and decreases the body's defenses to fight off infection. Another chemical is Carbon Monoxide is an asphyxiating gas that permanently binds to blood cells and blocks the transport of oxygen. This leads to tissue death and decreases your metabolism. Tar is a chemical that coats your lungs and trachea which impairs new oxygen coming in and carbon dioxide getting out of your body.   How does smoking impact surgery? - Smoking is particularly hazardous with regards to surgery. Surgery puts stress on the body and a smoker's body is already under strain from these chemicals. Putting the two together, especially for an elective surgery, could be a recipe for disaster. Smoking before and after surgery increases your risk of heart problems, slow wound healing, delayed bone healing, blood clots, wound infection and anesthesia complications.   What are the benefits of quitting? - In 20 minutes your blood pressure will drop back  down to normal. In 8 hours the carbon monoxide (a toxic gas) levels in your blood stream will drop by half, and oxygen levels will return to normal. In 48 hours your chance of having a heart attack will have decreased. All nicotine will have left your body. Your sense of taste and smell will return to a normal level. In 72 hours your bronchial tubes will relax, and your energy levels will increase. In 2 weeks your circulation will increase, and it will continue to improve for the next 10 weeks.    Recommendations for elective surgery - Ideally, patients should quit smoking 8 weeks before and at least 2 weeks after elective surgery in order to avoid complications. Simply cutting back on the amount of cigarettes smoked per day does not offer any benefit or decrease the risk of poor wound healing, heart problems, and infection. Smokers should also start taking Vitamin C and B for two weeks before surgery and two weeks after surgery.    Ways to Stop Smokin. Nicotine patches, lozenges, or gum  2. Support Groups  3. Medications (see below)    List of Medications:  1. Varenicline Tartrate (CHANTIX)   2. Bupropion HCL (WELLBUTRIN, ZYBAN)   note: make sure Wellbutrin is for smoking cessation and not other issues   3. Nicotine Patch (NICODERM)   4. Nicotine Inhaler (NICOTROL)   5. Nicotine Gum Nicotine Polacrilex   6. Nicotine Lozenge: Nicotine Polacrilex (COMMIT)   * [facility] offers a smoking support group as well!  Please visit: [email or web address]  If you are interested in these, ask about getting a prescription or talk to your primary care doctor about what may be the best way for you to quit.

## 2018-02-02 NOTE — PROGRESS NOTES
Pre-operative Questionnaire:    1) Do you smoke? Yes     2) Have you ever had a blood clot in your legs or lungs? no    3) Any family history of blood clots or bleeding disorders? no    4) Do you have an allergy or intolerance to any pain medication? no    5) Do you have a history of any dependency on pain medications? no    6) Does another doctor treat you for chronic pain or prescribe pain medication? no    7) Do you have help at home (family/friends) to assist after surgery? yes    8) Have you ever had complications after surgery? NA     9) Do you have problems with balance? ONLY WHEN FOOT IS IN PAIN     10) Any allergy to metal or jewelry? NO      11) Any allergy to suture material? NO     12) How much time off from work are you allowed or planning for? Na     13) Are you on oral birth control or hormone therapy? NO

## 2018-02-02 NOTE — LETTER
2/2/2018         RE: León Myers  [address] [place] [address]        Dear Colleague,    Thank you for referring your patient, León Myers, to the [facility]. Please see a copy of my visit note below.    Pre-operative Questionnaire:    1) Do you smoke? Yes     2) Have you ever had a blood clot in your legs or lungs? no    3) Any family history of blood clots or bleeding disorders? no    4) Do you have an allergy or intolerance to any pain medication? no    5) Do you have a history of any dependency on pain medications? no    6) Does another doctor treat you for chronic pain or prescribe pain medication? no    7) Do you have help at home (family/friends) to assist after surgery? yes    8) Have you ever had complications after surgery? NA     9) Do you have problems with balance? ONLY WHEN FOOT IS IN PAIN     10) Any allergy to metal or jewelry? NO      11) Any allergy to suture material? NO     12) How much time off from work are you allowed or planning for? Na     13) Are you on oral birth control or hormone therapy? NO       PATIENT HISTORY:  León Myers is a 38 year old male who presents to clinic for continuing b/l foot pain related to chronic calluses.  Prior visit for these issues with related 5th toe deformities.  Pt has tried wider shoes, debridement, otc callus pads/topicals.  He states he left his job due to the pain.   present.  No injury.  No fevers, chills.  Smoker.  Not working.  Nondiabetic.  Denies related family hx.       EXAM:  General appearance: Patient is alert and fully cooperative with history & exam.  No sign of distress is noted during the visit.     Dermatologic: Painful hyperkeratotic lesions dorsolateral left 5th toe x2, over PIPJ and just lateral to the nail.  Diffuse callusing on 1st and 2nd toes interdigitally.  Mild callusing near nail on right 5th toe w/pain.  No wounds.  No paronychia or evidence of soft tissue infection is  noted.      Vascular: DP & PT pulses are intact & regular bilaterally.  No significant edema or varicosities noted.  CFT and skin temperature are normal to both lower extremities.      Neurologic: Lower extremity sensation is intact to light touch.  No evidence of weakness or contracture in the lower extremities.  No evidence of neuropathy.      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  L 1st and 2nd toes with evidence of interdigital friction. Patient is ambulatory without assistive device or brace.  No gross ankle deformity noted.  No foot or ankle joint effusion is noted.    XRs taken today.  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  No acute findings.        ASSESSMENT:  b/l foot painful calluses, associated toe deformities      PLAN:  Reviewed patient's chart in epic.  Discussed condition and treatment options including pros and cons.     Discussed causes of calluses  They are due to areas of increased friction/pressure.  Toe deformity is contributory.  Discussed treatments such as using foot file, pumice stone, pads, orthotics, and not walking barefoot. Wider shoes advised.       Discussed possible toe surgery such as toe arthroplasty with derotational skin plasty.  May also consider removal of any contributory bone on 1st and 2nd toes.       Pt interested in surgery.  Planning handout given.  Risks, benefits, alternatives discussed.  Pt aware surgery is elective and can be avoided if desired.  Discussed recovery requirements.      F/u in clinic prior to surgery.     Paul Mcdowell DPM, FACFAS      Weight management plan: Patient was referred to their PCP to discuss a diet and exercise plan.          Consent:  B/l 5th toe arthroplasties with derotational skin plasties, possible left 1st and 2nd toe bone excision    Procedure(s):  1.  same    Diagnosis:  B/l hammertoes, calluses    Equipment:  Sagittal saw    Position:  supine    Tourniquet:   ankle    Mini-C:  yes    Anesthesia:  MAC    Allergies:    Allergies    Allergen Reactions     Nka [No Known Allergies]        Antibiotics:  Ancef    Procedure time:  90m    Dispo:  home    Surgery location:  [facility]            Again, thank you for allowing me to participate in the care of your patient.        Sincerely,        Paul Mcdowell DPM

## 2018-02-02 NOTE — MR AVS SNAPSHOT
"              After Visit Summary   2/2/2018    León Myers    MRN: [id number]           Patient Information     Date Of Birth          1980        Visit Information        Provider Department      2/2/2018 10:15 AM Paul Mcdowell DPM; [employer] [facility]        Today's Diagnoses     Hammer toes of both feet    -  1    Callus          Care Instructions    Surgical planning.  If you have decided to have surgery, follow these few steps to get the procedure scheduled and to have the proper paperwork filled out.  If you are unsure about surgery, or would like to sit down and further discuss your issue and treatment options, please make a clinic appointment with Dr Mcdowell.    1.  Pick the date that you would like to have surgery.  Keep in mind that you will likely need at least 2 weeks off after the procedure for proper rest and healing.    2.  Call the surgery scheduling line at [phone] to get the procedure scheduled.    3.  Make an appointment to see Dr Mcdowell within 1 week of the date of surgery for your pre-operative consult.  When making the appointment, say \"I need to make a 40 minute surgical consult with Dr Mcdowell\".  It is recommended that you bring a spouse, family member or friend with you.  There will be lots of information presented.  It can be overwhelming, and it is better to have someone there to help sort out the details.    4.  Make an appointment to see your Primary Care Physician within 4 weeks of the date of surgery for your \"Pre-operative History and Physical\".  This is done to make sure you are medically healthy to undergo surgery.    If you have any post-operative questions regarding your procedure, call our triage RNs at [phone].        Calluses, Corns, IPKs, Porokeratosis    When there is excessive friction or pressure on the skin, the body responds by making the skin thicker to protect the deeper structures from becoming exposed.  " While this works well to protect the deeper structures, the thickened skin can increase pressure and pain.    Flat, diffuse thickening are simple calluses and they are usually caused by friction.  Often these are the result of rubbing on a shoe or going barefoot.    Calluses with a central core between the toes are called corns.  These result from prominent joints on adjacent toes rubbing together.  Theses are a symptom of bone malalignment and will always recur unless the underlying bones are addressed surgically.    Calluses with a central core on the ball of the foot are usually IPKs (intractable plantar keratosis).  These are caused by excessive pressure from the metatarsals, the bones that make up the ball of the foot.  Often one of these bones is too long or too prominent.  Again, these will always recur unless the underlying bone issue is addressed.  There is no cure for these.  They will either go away by themselves, recur, or more could develop.    Regardless of the diagnosis, most of these lesions can be kept comfortable with routine maintenance.   1.This consists of filing them with a pumice stone or callus file a couple of times per week.    2. Lotion can be applied to soften the callus. A urea based cream such as Kersal or Vanicream or thicker cream with shea butter are good options.  3. Toe spacers or toe covers can be used for corns, gel pads can be used for other lesions on the bottom of the foot.   If there is a surgical pathology noted, such as a prominent bone, often this needs to be addressed surgically to minimize recurrence. However, sometimes the lesion simply migrates to another spot after surgery, so it is not a guaranteed cure.     Please call with any additional questions.       Hammertoe           What Is Hammertoe?  Hammertoe is a contracture (bending) of one or both joints of the second, third, fourth, or fifth (little) toes. This abnormal bending can put pressure on the toe when  wearing shoes, causing problems to develop.  Hammertoes usually start out as mild deformities and get progressively worse over time. In the earlier stages, hammertoes are flexible and the symptoms can often be managed with noninvasive measures. But if left untreated, hammertoes can become more rigid and will not respond to non-surgical treatment.  Because of the progressive nature of hammertoes, they should receive early attention. Hammertoes never get better without some kind of intervention.  Causes  The most common cause of hammertoe is a muscle/tendon imbalance. This imbalance, which leads to a bending of the toe, results from mechanical (structural) changes in the foot that occur over time in some people.  Hammertoes may be aggravated by shoes that don t fit properly. A hammertoe may result if a toe is too long and is forced into a cramped position when a tight shoe is worn.  Occasionally, hammertoe is the result of an earlier trauma to the toe. In some people, hammertoes are inherited.  Symptoms  Common symptoms of hammertoes include:  Pain or irritation of the affected toe when wearing shoes.   Corns and calluses (a buildup of skin) on the toe, between two toes, or on the ball of the foot. Corns are caused by constant friction against the shoe. They may be soft or hard, depending upon their location.   Inflammation, redness, or a burning sensation   Contracture of the toe   In more severe cases of hammertoe, open sores may form.   Diagnosis  Although hammertoes are readily apparent, to arrive at a diagnosis the foot and ankle surgeon will obtain a thorough history of your symptoms and examine your foot. During the physical examination, the doctor may attempt to reproduce your symptoms by manipulating your foot and will study the contractures of the toes. In addition, the foot and ankle surgeon may take x-rays to determine the degree of the deformities and assess any changes that may have occurred.    Hammertoes are progressive - they don t go away by themselves and usually they will get worse over time. However, not all cases are alike - some hammertoes progress more rapidly than others. Once your foot and ankle surgeon has evaluated your hammertoes, a treatment plan can be developed that is suited to your needs.  Non-surgical Treatment  There is a variety of treatment options for hammertoe. The treatment your foot and ankle surgeon selects will depend upon the severity of your hammertoe and other factors.  A number of non-surgical measures can be undertaken:  Padding corns and calluses. Your foot and ankle surgeon can provide or prescribe pads designed to shield corns from irritation. If you want to try over-the-counter pads, avoid the medicated types. Medicated pads are generally not recommended because they may contain a small amount of acid that can be harmful. Consult your surgeon about this option.   Changes in shoewear. Avoid shoes with pointed toes, shoes that are too short, or shoes with high heels - conditions that can force your toe against the front of the shoe. Instead, choose comfortable shoes with a deep, roomy toe box and heels no higher than two inches.   Orthotic devices. A custom orthotic device placed in your shoe may help control the muscle/tendon imbalance.   Injection therapy. Corticosteroid injections are sometimes used to ease pain and inflammation caused by hammertoe.   Medications. Oral nonsteroidal anti-inflammatory drugs (NSAIDs), such as ibuprofen, may be recommended to reduce pain and inflammation.   Splinting/strapping. Splints or small straps may be applied by the surgeon to realign the bent toe.       HAMMERTOE SURGERY   Hammertoe surgery is complex. The surgical procedure is an attempt to help the toe lay in a better position. Nearly every structure in the toe will be cut including the tendons, ligaments, skin and bone. Hammertoes are a complex deformity and final toe  position is difficult to predict. The only sure way to position a toe is to fuse all three digital joints. That will not happen as some degree of toe motion is needed for walking. The toe may not be completely reduced as the surrounding skin and other structures may not allow the toe to return to a normal position. The tendons on adjacent toes may need to be cut at the time of the original or subsequent surgeries, as interconnections exist between the toes. The toe may drift after surgery. Stiffness may develop leading to new areas of pressure.   Future shoe choices will be critical in allowing the surgery to provide comfort. The toes will still hurt if shoes rub. The original pain may also persist as other foot problems may be contributing to the current pain. The toe may or may not be pinned in place. External pins would require complete avoidance of water on the foot for six weeks. The pin would be removed about six weeks after the surgery. Strict attention to protection is critical. The pin could get bumped or loosen resulting in early removal. Removal might be necessary before the bone heals which would negatively affect the final surgical outcome and toe alignment.           SMOKING CESSATION  What's in cigarette smoke? - Cigarette smoke contains over 4,000 chemicals. Nicotine is one of the main ingredients which is an insecticide/herbicide. It is poisonous to our nervous system, increases blood clotting risk, and decreases the body's defenses to fight off infection. Another chemical is Carbon Monoxide is an asphyxiating gas that permanently binds to blood cells and blocks the transport of oxygen. This leads to tissue death and decreases your metabolism. Tar is a chemical that coats your lungs and trachea which impairs new oxygen coming in and carbon dioxide getting out of your body.   How does smoking impact surgery? - Smoking is particularly hazardous with regards to surgery. Surgery puts stress on the body  and a smoker's body is already under strain from these chemicals. Putting the two together, especially for an elective surgery, could be a recipe for disaster. Smoking before and after surgery increases your risk of heart problems, slow wound healing, delayed bone healing, blood clots, wound infection and anesthesia complications.   What are the benefits of quitting? - In 20 minutes your blood pressure will drop back down to normal. In 8 hours the carbon monoxide (a toxic gas) levels in your blood stream will drop by half, and oxygen levels will return to normal. In 48 hours your chance of having a heart attack will have decreased. All nicotine will have left your body. Your sense of taste and smell will return to a normal level. In 72 hours your bronchial tubes will relax, and your energy levels will increase. In 2 weeks your circulation will increase, and it will continue to improve for the next 10 weeks.    Recommendations for elective surgery - Ideally, patients should quit smoking 8 weeks before and at least 2 weeks after elective surgery in order to avoid complications. Simply cutting back on the amount of cigarettes smoked per day does not offer any benefit or decrease the risk of poor wound healing, heart problems, and infection. Smokers should also start taking Vitamin C and B for two weeks before surgery and two weeks after surgery.    Ways to Stop Smokin. Nicotine patches, lozenges, or gum  2. Support Groups  3. Medications (see below)    List of Medications:  1. Varenicline Tartrate (CHANTIX)   2. Bupropion HCL (WELLBUTRIN, ZYBAN) - note: make sure Wellbutrin is for smoking cessation and not other issues   3. Nicotine Patch (NICODERM)   4. Nicotine Inhaler (NICOTROL)   5. Nicotine Gum Nicotine Polacrilex   6. Nicotine Lozenge: Nicotine Polacrilex (COMMIT)   * [place] offers a smoking support group as well!  Please visit: [email or web address]  If you are interested in these, ask  "about getting a prescription or talk to your primary care doctor about what may be the best way for you to quit.                 Follow-ups after your visit        Your next 10 appointments already scheduled     2018 10:00 AM CST   [facility] Visit with Paul Mcdowell DPM   [facility]    [address] [address] [phone]              Who to contact     If you have questions or need follow up information about today's clinic visit or your schedule please contact [facility] directly at [phone].  Normal or non-critical lab and imaging results will be communicated to you by [email or web address], letter or phone within 4 business days after the clinic has received the results. If you do not hear from us within 7 days, please contact the clinic through [email or web address] or phone. If you have a critical or abnormal lab result, we will notify you by phone as soon as possible.  Submit refill requests through [employer] or call your pharmacy and they will forward the refill request to us. Please allow 3 business days for your refill to be completed.          Additional Information About Your Visit        MyChart Information     [employer] lets you send messages to your doctor, view your test results, renew your prescriptions, schedule appointments and more. To sign up, go to [email or web address] . Click on \"Log in\" on the left side of the screen, which will take you to the Welcome page. Then click on \"Sign up Now\" on the right side of the page.     You will be asked to enter the access code listed below, as well as some personal information. Please follow the directions to create your username and password.     Your access code is: [email or web address]  Expires: 2018  7:01 AM     Your access code will  in 90 days. If you need help or a new code, please call your [facility] or [phone].        Care EveryWhere ID     This is your Care EveryWhere ID. This " could be used by other organizations to access your [facility] medical records  [id number]         Blood Pressure from Last 3 Encounters:   12/12/17 122/78   12/11/17 110/70   12/04/17 110/70    Weight from Last 3 Encounters:   12/12/17 191 lb (86.6 kg)   12/11/17 191 lb (86.6 kg)   12/04/17 191 lb (86.6 kg)               Primary Care Provider Office Phone # Fax #    Eusebio KHAN MD Javad [phone]       [address] [place] [id number]        Equal Access to Services     [facility]: Hadii aad ku hadasho Soomaali, waaxda luqadaha, qaybta kaalmada adeegyada, ho rodriguez adeemy spencer . So [facility] [phone].    ATENCIÓN: Si habla español, tiene a fraser disposición servicios gratuitos de asistencia lingüística. [facility] [phone].    We comply with applicable federal civil rights laws and [place] laws. We do not discriminate on the basis of race, color, national origin, age, disability, sex, sexual orientation, or gender identity.            Thank you!     Thank you for choosing [facility]  for your care. Our goal is always to provide you with excellent care. Hearing back from our patients is one way we can continue to improve our services. Please take a few minutes to complete the written survey that you may receive in the mail after your visit with us. Thank you!             Your Updated Medication List - Protect others around you: Learn how to safely use, store and throw away your medicines at www.disposemymeds.org.          This list is accurate as of 2/2/18 11:59 PM.  Always use your most recent med list.                   Brand Name Dispense Instructions for use Diagnosis    clobetasol 0.05 % ointment    TEMOVATE    60 g    Apply sparingly to affected area twice daily as needed.  Do not apply to face.    Psoriasis       cyclobenzaprine 5 MG tablet    FLEXERIL    30 tablet    Take 1-2 tabs as needed at bedtime for pain interrupting sleep    Pain of left lower  extremity, Muscle tightness       Salicylic Acid 40 % Pads    CORN REMOVERS    4 each    Externally apply 1 each topically daily    Callus of foot

## 2018-02-02 NOTE — PROGRESS NOTES
PATIENT HISTORY:  León Myers is a 38 year old male who presents to clinic for continuing b/l foot pain related to chronic calluses.  Prior visit for these issues with related 5th toe deformities.  Pt has tried wider shoes, debridement, otc callus pads/topicals.  He states he left his job due to the pain.   present.  No injury.  No fevers, chills.  Smoker.  Not working.  Nondiabetic.  Denies related family hx.       EXAM:  General appearance: Patient is alert and fully cooperative with history & exam.  No sign of distress is noted during the visit.     Dermatologic: Painful hyperkeratotic lesions dorsolateral left 5th toe x2, over PIPJ and just lateral to the nail.  Diffuse callusing on 1st and 2nd toes interdigitally.  Mild callusing near nail on right 5th toe w/pain.  No wounds.  No paronychia or evidence of soft tissue infection is noted.      Vascular: DP & PT pulses are intact & regular bilaterally.  No significant edema or varicosities noted.  CFT and skin temperature are normal to both lower extremities.      Neurologic: Lower extremity sensation is intact to light touch.  No evidence of weakness or contracture in the lower extremities.  No evidence of neuropathy.      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  L 1st and 2nd toes with evidence of interdigital friction. Patient is ambulatory without assistive device or brace.  No gross ankle deformity noted.  No foot or ankle joint effusion is noted.    XRs taken today.  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  No acute findings.        ASSESSMENT: b/l foot painful calluses, associated toe deformities      PLAN:  Reviewed patient's chart in epic.  Discussed condition and treatment options including pros and cons.     Discussed causes of calluses  They are due to areas of increased friction/pressure.  Toe deformity is contributory.  Discussed treatments such as using foot file, pumice stone, pads, orthotics, and not walking barefoot. Wider shoes advised.        Discussed possible toe surgery such as toe arthroplasty with derotational skin plasty.  May also consider removal of any contributory bone on 1st and 2nd toes.       Pt interested in surgery.  Planning handout given.  Risks, benefits, alternatives discussed.  Pt aware surgery is elective and can be avoided if desired.  Discussed recovery requirements.      F/u in clinic prior to surgery.     Paul Mcdowell DPM, FACFAS      Weight management plan: Patient was referred to their PCP to discuss a diet and exercise plan.          Consent:  B/l 5th toe arthroplasties with derotational skin plasties, possible left 1st and 2nd toe bone excision    Procedure(s):  1.  same    Diagnosis:  B/l hammertoes, calluses    Equipment:  Sagittal saw    Position:  supine    Tourniquet:   ankle    Mini-C:  yes    Anesthesia:  MAC    Allergies:    Allergies   Allergen Reactions     Nka [No Known Allergies]        Antibiotics:  Ancef    Procedure time:  90m    Dispo:  home    Surgery location:  [facility]

## 2018-02-09 ENCOUNTER — HOSPITAL ENCOUNTER (OUTPATIENT)
Facility: CLINIC | Age: 38
End: 2018-02-09
Attending: PODIATRIST | Admitting: PODIATRIST

## 2018-02-09 ENCOUNTER — TELEPHONE (OUTPATIENT)
Dept: ORTHOPEDICS | Facility: CLINIC | Age: 38
End: 2018-02-09

## 2018-02-09 NOTE — TELEPHONE ENCOUNTER
Scheduled surgery for B/L 5th toe arthroplasties with derotational skin plasties, possible left 1st and 2nd toe bone excision on 3/08/2018 with Dr. Mcdowell @ [facility] @ 11:45.  Surgery education packet provided to patient.

## 2018-02-20 ENCOUNTER — OFFICE VISIT (OUTPATIENT)
Dept: PODIATRY | Facility: CLINIC | Age: 38
End: 2018-02-20
Payer: MEDICAID

## 2018-02-20 ENCOUNTER — TELEPHONE (OUTPATIENT)
Dept: PODIATRY | Facility: CLINIC | Age: 38
End: 2018-02-20

## 2018-02-20 VITALS
BODY MASS INDEX: 27.27 KG/M2 | DIASTOLIC BLOOD PRESSURE: 78 MMHG | SYSTOLIC BLOOD PRESSURE: 126 MMHG | WEIGHT: 201.3 LBS | HEIGHT: 72 IN

## 2018-02-20 DIAGNOSIS — M20.60 DEFORMITY OF TOE, UNSPECIFIED LATERALITY: ICD-10-CM

## 2018-02-20 DIAGNOSIS — L84 CALLUS: Primary | ICD-10-CM

## 2018-02-20 PROCEDURE — 99213 OFFICE O/P EST LOW 20 MIN: CPT | Performed by: PODIATRIST

## 2018-02-20 NOTE — PROGRESS NOTES
Weight management plan: Patient was referred to their PCP to discuss a diet and exercise plan.     MARILOU Luo MA February 20, 2018 2:13 PM

## 2018-02-20 NOTE — PROGRESS NOTES
"PATIENT HISTORY:  León Myers is a 38 year old male who presents to clinic for recheck of b/l calluses.  Pt was planning to have surgery in early March and was scheduled, but wants to cancel, as the calluses have \"fallen off.\"  Prior visit for these issues with related 5th toe deformities.  Pt has tried wider shoes, debridement, otc callus pads/topicals.   present.  No injury.  No fevers, chills.  Smoker.  Not working.  Nondiabetic.  Denies related family hx.       EXAM:  /78  Ht 6' (1.829 m)  Wt 201 lb 4.8 oz (91.3 kg)  BMI 27.3 kg/m2    General appearance: Patient is alert and fully cooperative with history & exam.  No sign of distress is noted during the visit.     Dermatologic: Diffuse hyperkeratotic lesions dorsolateral left 5th toe x2, over PIPJ and just lateral to the nail.  Diffuse callusing on 1st and 2nd toes interdigitally.  Mild callusing near nail on right 5th toe.  No wounds.  No paronychia or evidence of soft tissue infection is noted.      Vascular: DP & PT pulses are intact & regular bilaterally.  No significant edema or varicosities noted.  CFT and skin temperature are normal to both lower extremities.      Neurologic: Lower extremity sensation is intact to light touch.  No evidence of weakness or contracture in the lower extremities.  No evidence of neuropathy.      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  L 1st and 2nd toes with evidence of interdigital friction. Patient is ambulatory without assistive device or brace.  No gross ankle deformity noted.  No foot or ankle joint effusion is noted.    XRs taken prior and reviewed with pt.  Somewhat prominent digital bone at sites of calluses.      ASSESSMENT: b/l foot calluses, associated toe deformities      PLAN:  Reviewed patient's chart in epic.  Discussed condition and treatment options including pros and cons.    Pt wanting to cancel surgery.  He has improved.  I think he can manage this nonoperatively at " this point.  Advised regular home callus debridement with pumice and application of Kerasal moisturizer as needed.  Wider shoes advised.       Handout on callus care given.  F/u prn.     Paul Mcdowell DPM, FACFAS

## 2018-02-20 NOTE — MR AVS SNAPSHOT
After Visit Summary   2/20/2018    León Myers    MRN: [id number]           Patient Information     Date Of Birth          1980        Visit Information        Provider Department      2/20/2018 2:00 PM Paul Mcdowell DPM; [employer] [facility]        Today's Diagnoses     Callus    -  1    Deformity of toe, unspecified laterality          Care Instructions    Wear wide shoes      Calluses, Corns, IPKs, Porokeratosis    When there is excessive friction or pressure on the skin, the body responds by making the skin thicker to protect the deeper structures from becoming exposed.  While this works well to protect the deeper structures, the thickened skin can increase pressure and pain.    Flat, diffuse thickening are simple calluses and they are usually caused by friction.  Often these are the result of rubbing on a shoe or going barefoot.    Calluses with a central core between the toes are called corns.  These result from prominent joints on adjacent toes rubbing together.  Theses are a symptom of bone malalignment and will always recur unless the underlying bones are addressed surgically.    Calluses with a central core on the ball of the foot are usually IPKs (intractable plantar keratosis).  These are caused by excessive pressure from the metatarsals, the bones that make up the ball of the foot.  Often one of these bones is too long or too prominent.  Again, these will always recur unless the underlying bone issue is addressed.  There is no cure for these.  They will either go away by themselves, recur, or more could develop.    Regardless of the diagnosis, most of these lesions can be kept comfortable with routine maintenance.   1.This consists of filing them with a pumice stone or callus file a couple of times per week.    2. Lotion can be applied to soften the callus. A urea based cream such as Kersal or Vanicream or thicker cream with shea butter are  good options.   3. Toe spacers or toe covers can be used for corns, gel pads can be used for other lesions on the bottom of the foot.   If there is a surgical pathology noted, such as a prominent bone, often this needs to be addressed surgically to minimize recurrence. However, sometimes the lesion simply migrates to another spot after surgery, so it is not a guaranteed cure.     Please call with any additional questions.             Follow-ups after your visit        Follow-up notes from your care team     Return if symptoms worsen or fail to improve.      Your next 10 appointments already scheduled     Feb 23, 2018 10:15 AM CST   Pre-Op physical with Jamee Nickerson MD   [facility] ([facility]    [address] [id number] [phone]            Mar 08, 2018   Procedure with Paul Mcdowell DPM   [facility] (--)    [address]  [facility] [address] [phone]            Mar 20, 2018  9:15 AM CDT   Return Visit with Paul Mcdowell DPM   [facility] ([facility])    [address] [id number] [phone]              Who to contact     If you have questions or need follow up information about today's clinic visit or your schedule please contact [facility] directly at [phone].  Normal or non-critical lab and imaging results will be communicated to you by MyChart, letter or phone within 4 business days after the clinic has received the results. If you do not hear from us within 7 days, please contact the clinic through MyChart or phone. If you have a critical or abnormal lab result, we will notify you by phone as soon as possible.  Submit refill requests through [employer] or call your pharmacy and they will forward the refill request to us. Please allow 3 business days for your refill to be completed.          Additional Information About Your Visit       "  MyChart Information     Recorded Future lets you send messages to your doctor, view your test results, renew your prescriptions, schedule appointments and more. To sign up, go to [email or web address] . Click on \"Log in\" on the left side of the screen, which will take you to the Welcome page. Then click on \"Sign up Now\" on the right side of the page.     You will be asked to enter the access code listed below, as well as some personal information. Please follow the directions to create your username and password.     Your access code is: [email or web address]  Expires: 2018  7:01 AM     Your access code will  in 90 days. If you need help or a new code, please call your [place] clinic or [phone].        Care EveryWhere ID     This is your Care EveryWhere ID. This could be used by other organizations to access your [place] medical records  [id number]        Your Vitals Were     Height BMI (Body Mass Index)                6' (1.829 m) 27.3 kg/m2           Blood Pressure from Last 3 Encounters:   18 126/78   17 122/78   17 110/70    Weight from Last 3 Encounters:   18 201 lb 4.8 oz (91.3 kg)   17 191 lb (86.6 kg)   17 191 lb (86.6 kg)              Today, you had the following     No orders found for display       Primary Care Provider Office Phone # Fax #    Eusebio BILL Farnsworth [phone]       [address] [address]        Equal Access to Services     [facility]ium[facility] : Hadii sujata Mariee, wafélixda matthew, qaybta roalho hernandez. So [facility] [phone].    ATENCIÓN: Si habla español, tiene a fraser disposición servicios gratuitos de asistencia lingüística. Llame al [phone].    We comply with applicable federal civil rights laws and [place] laws. We do not discriminate on the basis of race, color, national origin, age, disability, sex, sexual orientation, or gender identity.            Thank " you!     Thank you for choosing [facility]  for your care. Our goal is always to provide you with excellent care. Hearing back from our patients is one way we can continue to improve our services. Please take a few minutes to complete the written survey that you may receive in the mail after your visit with us. Thank you!             Your Updated Medication List - Protect others around you: Learn how to safely use, store and throw away your medicines at www.disposemymeds.org.          This list is accurate as of 2/20/18  2:24 PM.  Always use your most recent med list.                   Brand Name Dispense Instructions for use Diagnosis    clobetasol 0.05 % ointment    TEMOVATE    60 g    Apply sparingly to affected area twice daily as needed.  Do not apply to face.    Psoriasis       cyclobenzaprine 5 MG tablet    FLEXERIL    30 tablet    Take 1-2 tabs as needed at bedtime for pain interrupting sleep    Pain of left lower extremity, Muscle tightness       Salicylic Acid 40 % Pads    CORN REMOVERS    4 each    Externally apply 1 each topically daily    Callus of foot

## 2018-02-20 NOTE — PATIENT INSTRUCTIONS
Wear wide shoes      Calluses, Corns, IPKs, Porokeratosis    When there is excessive friction or pressure on the skin, the body responds by making the skin thicker to protect the deeper structures from becoming exposed.  While this works well to protect the deeper structures, the thickened skin can increase pressure and pain.    Flat, diffuse thickening are simple calluses and they are usually caused by friction.  Often these are the result of rubbing on a shoe or going barefoot.    Calluses with a central core between the toes are called corns.  These result from prominent joints on adjacent toes rubbing together.  Theses are a symptom of bone malalignment and will always recur unless the underlying bones are addressed surgically.    Calluses with a central core on the ball of the foot are usually IPKs (intractable plantar keratosis).  These are caused by excessive pressure from the metatarsals, the bones that make up the ball of the foot.  Often one of these bones is too long or too prominent.  Again, these will always recur unless the underlying bone issue is addressed.  There is no cure for these.  They will either go away by themselves, recur, or more could develop.    Regardless of the diagnosis, most of these lesions can be kept comfortable with routine maintenance.   1.This consists of filing them with a pumice stone or callus file a couple of times per week.    2. Lotion can be applied to soften the callus. A urea based cream such as Kerasal or Vanicream or thicker cream with shea butter are good options.   3. Toe spacers or toe covers can be used for corns, gel pads can be used for other lesions on the bottom of the foot.   If there is a surgical pathology noted, such as a prominent bone, often this needs to be addressed surgically to minimize recurrence. However, sometimes the lesion simply migrates to another spot after surgery, so it is not a guaranteed cure.     Please call with any additional  questions.

## 2018-02-20 NOTE — TELEPHONE ENCOUNTER
Please call pt.   needed.  I wanted to clarify an otc med recommendation for callus care.  I advised Kerasal, but there are several Kerasal products and I want to avoid confusion.  Kerasal Intensive Foot Repair Cream is what I would recommend for callus care/moisturizing.  This is available over the counter.  If [employer] does not carry it in store, they have it online.  It is also available on [employer] and at some [employer] locations.  VanApisphere is an alternative.  Thanks.

## 2018-02-20 NOTE — LETTER
"    2/20/2018         RE: León Myers  [address] [place] [address]        Dear Colleague,    Thank you for referring your patient, León Myers, to the [facility]. Please see a copy of my visit note below.    Weight management plan: Patient was referred to their PCP to discuss a diet and exercise plan.     MARILOU Luo MA February 20, 2018 2:13 PM      PATIENT HISTORY:  León Myers is a 38 year old male who presents to clinic for recheck of b/l calluses.  Pt was planning to have surgery in early March and was scheduled, but wants to cancel, as the calluses have \"fallen off.\"  Prior visit for these issues with related 5th toe deformities.  Pt has tried wider shoes, debridement, otc callus pads/topicals.   present.  No injury.  No fevers, chills.  Smoker.  Not working.  Nondiabetic.  Denies related family hx.       EXAM:  /78  Ht 6' (1.829 m)  Wt 201 lb 4.8 oz (91.3 kg)  BMI 27.3 kg/m2    General appearance: Patient is alert and fully cooperative with history & exam.  No sign of distress is noted during the visit.     Dermatologic: Diffuse hyperkeratotic lesions dorsolateral left 5th toe x2, over PIPJ and just lateral to the nail.  Diffuse callusing on 1st and 2nd toes interdigitally.  Mild callusing near nail on right 5th toe.  No wounds.  No paronychia or evidence of soft tissue infection is noted.      Vascular: DP & PT pulses are intact & regular bilaterally.  No significant edema or varicosities noted.  CFT and skin temperature are normal to both lower extremities.      Neurologic: Lower extremity sensation is intact to light touch.  No evidence of weakness or contracture in the lower extremities.  No evidence of neuropathy.      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  L 1st and 2nd toes with evidence of interdigital friction. Patient is ambulatory without assistive device or brace.  No gross ankle deformity noted.  No foot or ankle joint " effusion is noted.    XRs taken prior and reviewed with pt.  Somewhat prominent digital bone at sites of calluses.      ASSESSMENT: b/l foot calluses, associated toe deformities      PLAN:  Reviewed patient's chart in epic.  Discussed condition and treatment options including pros and cons.    Pt wanting to cancel surgery.  He has improved.  I think he can manage this nonoperatively at this point.  Advised regular home callus debridement with pumice and application of Kerasal moisturizer as needed.  Wider shoes advised.       Handout on callus care given.  F/u prn.     Paul Mcdowell DPM, FACFAS        Again, thank you for allowing me to participate in the care of your patient.        Sincerely,        Paul Mcdowell DPM

## 2018-02-20 NOTE — NURSING NOTE
Chief Complaint   Patient presents with     Consult     Patient is requesting to not have surgery        Initial /78  Ht 6' (1.829 m)  Wt 201 lb 4.8 oz (91.3 kg)  BMI 27.3 kg/m2 Estimated body mass index is 27.3 kg/(m^2) as calculated from the following:    Height as of this encounter: 6' (1.829 m).    Weight as of this encounter: 201 lb 4.8 oz (91.3 kg).  Medication Reconciliation: meggan Luo MA February 20, 2018 2:13 PM

## 2018-02-20 NOTE — TELEPHONE ENCOUNTER
Patient called to cancel their surgery on 3/08/2018 with Dr. Mcdowell at [facility].

## 2018-05-23 ENCOUNTER — OFFICE VISIT (OUTPATIENT)
Dept: FAMILY MEDICINE | Facility: CLINIC | Age: 38
End: 2018-05-23
Payer: COMMERCIAL

## 2018-05-23 ENCOUNTER — RADIANT APPOINTMENT (OUTPATIENT)
Dept: GENERAL RADIOLOGY | Facility: CLINIC | Age: 38
End: 2018-05-23
Attending: FAMILY MEDICINE
Payer: COMMERCIAL

## 2018-05-23 VITALS
SYSTOLIC BLOOD PRESSURE: 109 MMHG | BODY MASS INDEX: 26.01 KG/M2 | OXYGEN SATURATION: 99 % | WEIGHT: 192 LBS | RESPIRATION RATE: 14 BRPM | HEART RATE: 76 BPM | TEMPERATURE: 97 F | HEIGHT: 72 IN | DIASTOLIC BLOOD PRESSURE: 74 MMHG

## 2018-05-23 DIAGNOSIS — R07.9 CHEST PAIN, UNSPECIFIED TYPE: Primary | ICD-10-CM

## 2018-05-23 DIAGNOSIS — R07.9 CHEST PAIN, UNSPECIFIED TYPE: ICD-10-CM

## 2018-05-23 DIAGNOSIS — R10.9 LEFT FLANK PAIN: ICD-10-CM

## 2018-05-23 PROCEDURE — 99213 OFFICE O/P EST LOW 20 MIN: CPT | Performed by: FAMILY MEDICINE

## 2018-05-23 PROCEDURE — 71046 X-RAY EXAM CHEST 2 VIEWS: CPT | Mod: FY

## 2018-05-23 NOTE — MR AVS SNAPSHOT
"              After Visit Summary   5/23/2018    León Myers    MRN: [id number]           Patient Information     Date Of Birth          1980        Visit Information        Provider Department      5/23/2018 1:25 PM Eusebio Farnsworth MD; [employer] [facility]        Today's Diagnoses     Chest pain, unspecified type    -  1    Left flank pain           Follow-ups after your visit        Follow-up notes from your care team     Return in about 1 month (around 6/23/2018).      Future tests that were ordered for you today     Open Future Orders        Priority Expected Expires Ordered    XR Chest 2 Views Routine 5/23/2018 5/23/2019 5/23/2018    Exercise Stress Echocardiogram Routine  5/23/2019 5/23/2018            Who to contact     If you have questions or need follow up information about today's clinic visit or your schedule please contact [facility] directly at [phone].  Normal or non-critical lab and imaging results will be communicated to you by MyChart, letter or phone within 4 business days after the clinic has received the results. If you do not hear from us within 7 days, please contact the clinic through [employer] or phone. If you have a critical or abnormal lab result, we will notify you by phone as soon as possible.  Submit refill requests through [employer] or call your pharmacy and they will forward the refill request to us. Please allow 3 business days for your refill to be completed.          Additional Information About Your Visit        [employer] Information     [employer] lets you send messages to your doctor, view your test results, renew your prescriptions, schedule appointments and more. To sign up, go to [email or web address] . Click on \"Log in\" on the left side of the screen, which will take you to the Welcome page. Then click on \"Sign up Now\" on the right side of the page.     You will be asked to enter the access code listed below, as " well as some personal information. Please follow the directions to create your username and password.     Your access code is: [email or web address]  Expires: 2018  2:09 PM     Your access code will  in 90 days. If you need help or a new code, please call your [place] clinic or [phone].        Care EveryWhere ID     This is your Care EveryWhere ID. This could be used by other organizations to access your [place] medical records  [id number]        Your Vitals Were     Pulse Temperature Respirations Height Pulse Oximetry BMI (Body Mass Index)    76 97  F (36.1  C) (Tympanic) 14 6' (1.829 m) 99% 26.04 kg/m2       Blood Pressure from Last 3 Encounters:   18 109/74   18 126/78   17 122/78    Weight from Last 3 Encounters:   18 192 lb (87.1 kg)   18 201 lb 4.8 oz (91.3 kg)   17 191 lb (86.6 kg)               Primary Care Provider Office Phone # Fax #    Eusebio KHAN MD Javad [phone]       [address] [address]        Equal Access to Services     SAMM PRECIADO AH: Hadii sujata ku hadasho Soomaali, waaxda luqadaha, qaybta kaalmada adeegyada, waxay fior haycarolinen ileana ward. So [facility] [phone].    ATENCIÓN: Si habla español, tiene a fraser disposición servicios gratuitos de asistencia lingüística. Llame al [phone].    We comply with applicable federal civil rights laws and [place] laws. We do not discriminate on the basis of race, color, national origin, age, disability, sex, sexual orientation, or gender identity.            Thank you!     Thank you for choosing [facility]  for your care. Our goal is always to provide you with excellent care. Hearing back from our patients is one way we can continue to improve our services. Please take a few minutes to complete the written survey that you may receive in the mail after your visit with us. Thank you!             Your Updated Medication List - Protect others around  you: Learn how to safely use, store and throw away your medicines at www.disposemymeds.org.      Notice  As of 5/23/2018  2:09 PM    You have not been prescribed any medications.

## 2018-05-23 NOTE — PROGRESS NOTES
SUBJECTIVE:   León Myers is a 38 year old male who presents to clinic today for the following health issues:      Musculoskeletal problem/pain      Duration: 3 months     Description  Location: left shoulder, upper back     Intensity:  moderate    Accompanying signs and symptoms: none    History  Previous similar problem: no   Previous evaluation:  none    Precipitating or alleviating factors:  Trauma or overuse: no   Aggravating factors include: lifting, activity    Therapies tried and outcome: Ibuprofen     Problem list and histories reviewed & adjusted, as indicated.  Additional history: as documented    Patient Active Problem List   Diagnosis     Neck pain     Esophageal reflux     H. pylori infection     Body aches     Pain of left lower extremity     Past Surgical History:   Procedure Laterality Date     NO HISTORY OF SURGERY         Social History   Substance Use Topics     Smoking status: Current Every Day Smoker     Packs/day: 0.50     Types: Cigarettes     Smokeless tobacco: Never Used     Alcohol use No     Family History   Problem Relation Age of Onset     DIABETES No family hx of      C.A.D. No family hx of          No current outpatient prescriptions on file.     Allergies   Allergen Reactions     Nka [No Known Allergies]      Recent Labs   Lab Test  09/15/17   1015  06/26/14   1904  06/19/14   0849   LDL   --    --   76   HDL   --    --   42   TRIG   --    --   167*   ALT  32   --    --    CR  0.85   --    --    GFRESTIMATED  >90   --    --    GFRESTBLACK  >90   --    --    POTASSIUM  3.6   --    --    TSH   --   1.54   --       BP Readings from Last 3 Encounters:   05/23/18 109/74   02/20/18 126/78   12/12/17 122/78    Wt Readings from Last 3 Encounters:   05/23/18 192 lb (87.1 kg)   02/20/18 201 lb 4.8 oz (91.3 kg)   12/12/17 191 lb (86.6 kg)                    Reviewed and updated as needed this visit by clinical staff       Reviewed and updated as needed this visit by Provider          ROS:  Constitutional, HEENT, cardiovascular, pulmonary, gi and gu systems are negative, except as otherwise noted.    OBJECTIVE:     /74 (Cuff Size: Adult Regular)  Pulse 76  Temp 97  F (36.1  C) (Tympanic)  Resp 14  Ht 6' (1.829 m)  Wt 192 lb (87.1 kg)  SpO2 99%  BMI 26.04 kg/m2  Body mass index is 26.04 kg/(m^2).  GENERAL: healthy, alert and no distress  NECK: no adenopathy, no asymmetry, masses, or scars and thyroid normal to palpation  RESP: lungs clear to auscultation - no rales, rhonchi or wheezes  CV: regular rate and rhythm, normal S1 S2, no S3 or S4, no murmur, click or rub, no peripheral edema and peripheral pulses strong  ABDOMEN: soft, nontender, no hepatosplenomegaly, no masses and bowel sounds normal  MS: no gross musculoskeletal defects noted, no edema        ASSESSMENT/PLAN:   León was seen today for musculoskeletal problem.    Diagnoses and all orders for this visit:    Chest pain, unspecified type  -     XR Chest 2 Views; Future  -     Exercise Stress Echocardiogram; Future    Left flank pain  -     XR Chest 2 Views; Future  -     Exercise Stress Echocardiogram; Future      Has been having for 3 months intermittently, worsening with physical exertion  Will have him to check CXR and stress echocardiogram  If cardiopumonic pathology ruled out, will have him to start PT for chest wall pain    FUTURE APPOINTMENTS:       - Follow-up visit in 1 month     Eusebio Farnsworth MD  [facility]

## 2018-06-01 ENCOUNTER — HOSPITAL ENCOUNTER (OUTPATIENT)
Dept: CARDIOLOGY | Facility: CLINIC | Age: 38
Discharge: HOME | End: 2018-06-01
Attending: FAMILY MEDICINE | Admitting: FAMILY MEDICINE
Payer: COMMERCIAL

## 2018-06-01 DIAGNOSIS — R10.9 LEFT FLANK PAIN: ICD-10-CM

## 2018-06-01 DIAGNOSIS — R07.9 CHEST PAIN, UNSPECIFIED TYPE: ICD-10-CM

## 2018-06-01 PROCEDURE — 93350 STRESS TTE ONLY: CPT | Mod: 26 | Performed by: INTERNAL MEDICINE

## 2018-06-01 PROCEDURE — 93325 DOPPLER ECHO COLOR FLOW MAPG: CPT | Mod: 26 | Performed by: INTERNAL MEDICINE

## 2018-06-01 PROCEDURE — 93016 CV STRESS TEST SUPVJ ONLY: CPT | Performed by: INTERNAL MEDICINE

## 2018-06-01 PROCEDURE — 93350 STRESS TTE ONLY: CPT | Mod: TC

## 2018-06-01 PROCEDURE — 93018 CV STRESS TEST I&R ONLY: CPT | Performed by: INTERNAL MEDICINE

## 2018-06-01 PROCEDURE — 93321 DOPPLER ECHO F-UP/LMTD STD: CPT | Mod: 26 | Performed by: INTERNAL MEDICINE

## 2018-06-07 ENCOUNTER — OFFICE VISIT (OUTPATIENT)
Dept: FAMILY MEDICINE | Facility: CLINIC | Age: 38
End: 2018-06-07
Payer: COMMERCIAL

## 2018-06-07 VITALS
HEART RATE: 85 BPM | OXYGEN SATURATION: 99 % | BODY MASS INDEX: 26.41 KG/M2 | TEMPERATURE: 97.8 F | DIASTOLIC BLOOD PRESSURE: 78 MMHG | SYSTOLIC BLOOD PRESSURE: 110 MMHG | HEIGHT: 72 IN | WEIGHT: 195 LBS | RESPIRATION RATE: 12 BRPM

## 2018-06-07 DIAGNOSIS — R07.9 CHEST PAIN ON EXERTION: Primary | ICD-10-CM

## 2018-06-07 DIAGNOSIS — R09.1 PLEURITIS: ICD-10-CM

## 2018-06-07 PROCEDURE — 99214 OFFICE O/P EST MOD 30 MIN: CPT | Performed by: FAMILY MEDICINE

## 2018-06-07 RX ORDER — DOXYCYCLINE 100 MG/1
100 CAPSULE ORAL 2 TIMES DAILY
Qty: 20 CAPSULE | Refills: 0 | Status: SHIPPED | OUTPATIENT
Start: 2018-06-07 | End: 2019-08-05

## 2018-06-07 NOTE — PROGRESS NOTES
SUBJECTIVE:   León Myers is a 38 year old male who presents to clinic today for the following health issues:      Results        Description (location/character/radiation): Pt would like to go over his exercise stress echocardiogram.         Problem list and histories reviewed & adjusted, as indicated.  Additional history: as documented    Patient Active Problem List   Diagnosis     Neck pain     Esophageal reflux     H. pylori infection     Body aches     Pain of left lower extremity     Past Surgical History:   Procedure Laterality Date     NO HISTORY OF SURGERY         Social History   Substance Use Topics     Smoking status: Current Every Day Smoker     Packs/day: 0.50     Types: Cigarettes     Smokeless tobacco: Never Used     Alcohol use No     Family History   Problem Relation Age of Onset     DIABETES No family hx of      C.A.D. No family hx of          Current Outpatient Prescriptions   Medication Sig Dispense Refill     doxycycline (VIBRAMYCIN) 100 MG capsule Take 1 capsule (100 mg) by mouth 2 times daily 20 capsule 0     Allergies   Allergen Reactions     Nka [No Known Allergies]      Recent Labs   Lab Test  09/15/17   1015  06/26/14   1904  06/19/14   0849   LDL   --    --   76   HDL   --    --   42   TRIG   --    --   167*   ALT  32   --    --    CR  0.85   --    --    GFRESTIMATED  >90   --    --    GFRESTBLACK  >90   --    --    POTASSIUM  3.6   --    --    TSH   --   1.54   --       BP Readings from Last 3 Encounters:   06/07/18 110/78   05/23/18 109/74   02/20/18 126/78    Wt Readings from Last 3 Encounters:   06/07/18 195 lb (88.5 kg)   05/23/18 192 lb (87.1 kg)   02/20/18 201 lb 4.8 oz (91.3 kg)                    Reviewed and updated as needed this visit by clinical staff       Reviewed and updated as needed this visit by Provider         ROS:  Constitutional, HEENT, cardiovascular, pulmonary, gi and gu systems are negative, except as otherwise noted.    OBJECTIVE:     /78 (Cuff  Size: Adult Regular)  Pulse 85  Temp 97.8  F (36.6  C) (Tympanic)  Resp 12  Ht 6' (1.829 m)  Wt 195 lb (88.5 kg)  SpO2 99%  BMI 26.45 kg/m2  Body mass index is 26.45 kg/(m^2).  GENERAL: healthy, alert and no distress  NECK: no adenopathy, no asymmetry, masses, or scars and thyroid normal to palpation  RESP: lungs clear to auscultation - no rales, rhonchi or wheezes  CV: regular rate and rhythm, normal S1 S2, no S3 or S4, no murmur, click or rub, no peripheral edema and peripheral pulses strong  ABDOMEN: soft, nontender, no hepatosplenomegaly, no masses and bowel sounds normal  MS: no gross musculoskeletal defects noted, no edema        ASSESSMENT/PLAN:   ASSESSMENT / PLAN:  (R07.9) Chest pain on exertion  (primary encounter diagnosis)  Comment: has improvement, still has chest tightness with SOB, stress echo seems normal in general, but he had pain while on the exercise stage,   Cardiology recommended coronary angio CT, pt declined   Plan: will keep watching sx     (R09.1) Pleuritis  Comment: has pleuritic chest pain, will have him to try abx  Plan: doxycycline (VIBRAMYCIN) 100 MG capsule            FUTURE APPOINTMENTS:       - Follow-up visit in 1 month     Eusebio Farnsworth MD  [facility] [place]

## 2018-06-07 NOTE — MR AVS SNAPSHOT
"              After Visit Summary   2018    León Myers    MRN: [id number]           Patient Information     Date Of Birth          1980        Visit Information        Provider Department      2018 1:25 PM Eusebio Farnsworth MD; [facility] [employer] [facility]        Today's Diagnoses     Chest pain on exertion    -  1    Pleuritis           Follow-ups after your visit        Follow-up notes from your care team     Return in about 4 weeks (around 2018) for chest pain.      Who to contact     If you have questions or need follow up information about today's clinic visit or your schedule please contact [facility] directly at [phone].  Normal or non-critical lab and imaging results will be communicated to you by MyChart, letter or phone within 4 business days after the clinic has received the results. If you do not hear from us within 7 days, please contact the clinic through MyChart or phone. If you have a critical or abnormal lab result, we will notify you by phone as soon as possible.  Submit refill requests through [employer] or call your pharmacy and they will forward the refill request to us. Please allow 3 business days for your refill to be completed.          Additional Information About Your Visit        MyChart Information     [employer] lets you send messages to your doctor, view your test results, renew your prescriptions, schedule appointments and more. To sign up, go to [email or web address] . Click on \"Log in\" on the left side of the screen, which will take you to the Welcome page. Then click on \"Sign up Now\" on the right side of the page.     You will be asked to enter the access code listed below, as well as some personal information. Please follow the directions to create your username and password.     Your access code is: [email or web address]  Expires: 2018  2:09 PM     Your access code will  in 90 days. If you need help or a new " code, please call your [place] clinic or [phone].        Care EveryWhere ID     This is your Care EveryWhere ID. This could be used by other organizations to access your [place] medical records  [id number]        Your Vitals Were     Pulse Temperature Respirations Height Pulse Oximetry BMI (Body Mass Index)    85 97.8  F (36.6  C) (Tympanic) 12 6' (1.829 m) 99% 26.45 kg/m2       Blood Pressure from Last 3 Encounters:   06/07/18 110/78   05/23/18 109/74   02/20/18 126/78    Weight from Last 3 Encounters:   06/07/18 195 lb (88.5 kg)   05/23/18 192 lb (87.1 kg)   02/20/18 201 lb 4.8 oz (91.3 kg)              Today, you had the following     No orders found for display         Today's Medication Changes          These changes are accurate as of 6/7/18  1:47 PM.  If you have any questions, ask your nurse or doctor.               Start taking these medicines.        Dose/Directions    doxycycline 100 MG capsule   Commonly known as:  VIBRAMYCIN   Used for:  Pleuritis   Started by:  Eusebio Farnsworth MD        Dose:  100 mg   Take 1 capsule (100 mg) by mouth 2 times daily   Quantity:  20 capsule   Refills:  0            Where to get your medicines      These medications were sent to [id number] - [place], [place] - [address] AT [address]  [address] [facility] [place] [address]     Phone:  [phone]     doxycycline 100 MG capsule                Primary Care Provider Office Phone # Fax #    Eusebio Farnsworth [phone]       [address] [facility]  [place] [address]        Equal Access to Services     [facility]: Hadii sujata khanna Soxin, waaxda luqadaha, qaybta kaalmada adeegyada, ho ward. So [facility] [phone].    ATENCIÓN: Si habla español, tiene a fraser disposición servicios gratuitos de asistencia lingüística. Llame al [phone].    We comply with applicable federal civil rights laws and [place]  laws. We do not discriminate on the basis of race, color, national origin, age, disability, sex, sexual orientation, or gender identity.            Thank you!     Thank you for choosing [facility] [place]  for your care. Our goal is always to provide you with excellent care. Hearing back from our patients is one way we can continue to improve our services. Please take a few minutes to complete the written survey that you may receive in the mail after your visit with us. Thank you!             Your Updated Medication List - Protect others around you: Learn how to safely use, store and throw away your medicines at www.disposemymeds.org.          This list is accurate as of 6/7/18  1:47 PM.  Always use your most recent med list.                   Brand Name Dispense Instructions for use Diagnosis    doxycycline 100 MG capsule    VIBRAMYCIN    20 capsule    Take 1 capsule (100 mg) by mouth 2 times daily    Pleuritis

## 2019-08-05 ENCOUNTER — OFFICE VISIT (OUTPATIENT)
Dept: FAMILY MEDICINE | Facility: CLINIC | Age: 39
End: 2019-08-05
Payer: COMMERCIAL

## 2019-08-05 VITALS
TEMPERATURE: 97.5 F | OXYGEN SATURATION: 97 % | DIASTOLIC BLOOD PRESSURE: 84 MMHG | HEIGHT: 72 IN | WEIGHT: 192 LBS | SYSTOLIC BLOOD PRESSURE: 122 MMHG | RESPIRATION RATE: 14 BRPM | HEART RATE: 95 BPM | BODY MASS INDEX: 26.01 KG/M2

## 2019-08-05 DIAGNOSIS — G44.209 TENSION HEADACHE: ICD-10-CM

## 2019-08-05 DIAGNOSIS — L30.9 DERMATITIS: ICD-10-CM

## 2019-08-05 DIAGNOSIS — R35.0 URINE FREQUENCY: Primary | ICD-10-CM

## 2019-08-05 DIAGNOSIS — M54.5 ACUTE LOW BACK PAIN, UNSPECIFIED BACK PAIN LATERALITY, WITH SCIATICA PRESENCE UNSPECIFIED: ICD-10-CM

## 2019-08-05 LAB
ALBUMIN UR-MCNC: NEGATIVE MG/DL
APPEARANCE UR: CLEAR
BACTERIA #/AREA URNS HPF: ABNORMAL /HPF
BILIRUB UR QL STRIP: NEGATIVE
COLOR UR AUTO: YELLOW
GLUCOSE UR STRIP-MCNC: NEGATIVE MG/DL
HGB UR QL STRIP: ABNORMAL
KETONES UR STRIP-MCNC: NEGATIVE MG/DL
LEUKOCYTE ESTERASE UR QL STRIP: NEGATIVE
NITRATE UR QL: NEGATIVE
NON-SQ EPI CELLS #/AREA URNS LPF: ABNORMAL /LPF
PH UR STRIP: 6.5 PH (ref 5–7)
RBC #/AREA URNS AUTO: ABNORMAL /HPF
SOURCE: ABNORMAL
SP GR UR STRIP: 1.01 (ref 1–1.03)
UROBILINOGEN UR STRIP-ACNC: 0.2 EU/DL (ref 0.2–1)
WBC #/AREA URNS AUTO: ABNORMAL /HPF

## 2019-08-05 PROCEDURE — 81001 URINALYSIS AUTO W/SCOPE: CPT | Performed by: PHYSICIAN ASSISTANT

## 2019-08-05 PROCEDURE — 36415 COLL VENOUS BLD VENIPUNCTURE: CPT | Performed by: PHYSICIAN ASSISTANT

## 2019-08-05 PROCEDURE — 80048 BASIC METABOLIC PNL TOTAL CA: CPT | Performed by: PHYSICIAN ASSISTANT

## 2019-08-05 PROCEDURE — 87086 URINE CULTURE/COLONY COUNT: CPT | Performed by: PHYSICIAN ASSISTANT

## 2019-08-05 PROCEDURE — 99214 OFFICE O/P EST MOD 30 MIN: CPT | Performed by: PHYSICIAN ASSISTANT

## 2019-08-05 PROCEDURE — G0103 PSA SCREENING: HCPCS | Performed by: PHYSICIAN ASSISTANT

## 2019-08-05 RX ORDER — CLOBETASOL PROPIONATE 0.5 MG/G
CREAM TOPICAL 2 TIMES DAILY
Qty: 60 G | Refills: 0 | Status: SHIPPED | OUTPATIENT
Start: 2019-08-05 | End: 2019-09-05

## 2019-08-05 RX ORDER — CYCLOBENZAPRINE HCL 5 MG
5 TABLET ORAL 3 TIMES DAILY PRN
Qty: 20 TABLET | Refills: 0 | Status: SHIPPED | OUTPATIENT
Start: 2019-08-05

## 2019-08-05 ASSESSMENT — MIFFLIN-ST. JEOR: SCORE: 1813.91

## 2019-08-05 NOTE — PROGRESS NOTES
Subjective     León Myers is a 39 year old male who presents to clinic today for the following health issues:    HPI   Body Aches      Duration: 2 days    Description (location/character/radiation): feeling sick and body hurts all over. Pt is urinating a lot but denies pain with urination or blood in urine. States that he woke up numerous times last night to urinate.     Intensity:  moderate    History (similar episodes/previous evaluation): None    Precipitating or alleviating factors: None    Therapies tried and outcome: pain meds did not help       Bilateral low back pain: bilateral low back pain that is worse with movement.  Pain radiates into buttocks  Intermittently.  No back injury.  No weakness, tingling or numbness in LE.     Dermatitis: flaky itching plaques on skin of shins bilaterally that seem to come and go.  Has tried lotions in the past for this, nothing has worked    Headache: ongoing headaches.  Taking ibuprofen or tylenol daily for these. Pain is located in frontal region and between eyes. Seems to occur more when he is fatigued.       Patient Active Problem List   Diagnosis     Neck pain     Esophageal reflux     H. pylori infection     Body aches     Pain of left lower extremity     Past Surgical History:   Procedure Laterality Date     NO HISTORY OF SURGERY         Social History     Tobacco Use     Smoking status: Current Every Day Smoker     Packs/day: 0.50     Types: Cigarettes     Smokeless tobacco: Never Used   Substance Use Topics     Alcohol use: No     Family History   Problem Relation Age of Onset     Diabetes No family hx of      C.A.D. No family hx of          Current Outpatient Medications   Medication Sig Dispense Refill     clobetasol (TEMOVATE) 0.05 % external cream Apply topically 2 times daily 60 g 0     cyclobenzaprine (FLEXERIL) 5 MG tablet Take 1 tablet (5 mg) by mouth 3 times daily as needed for muscle spasms 20 tablet 0     Allergies   Allergen Reactions     Nka [No  Known Allergies]          Reviewed and updated as needed this visit by Provider         Review of Systems   ROS COMP: Constitutional, HEENT, cardiovascular, pulmonary, GI, , musculoskeletal, neuro, skin, endocrine and psych systems are negative, except as otherwise noted.      Objective    /84   Pulse 95   Temp 97.5  F (36.4  C) (Tympanic)   Resp 14   Ht 1.829 m (6')   Wt 87.1 kg (192 lb)   SpO2 97%   BMI 26.04 kg/m    Body mass index is 26.04 kg/m .  Physical Exam   GENERAL: healthy, alert and no distress  RESP: lungs clear to auscultation - no rales, rhonchi or wheezes  CV: regular rate and rhythm, normal S1 S2  ABDOMEN: soft, nontender, no hepatosplenomegaly, no masses and bowel sounds normal, n CVAT  MS: no gross musculoskeletal defects noted, no edema, FROM of LE bilaterally, 5/5 strength of LE, LE DTRs intact  NEURO: Normal strength and tone, mentation intact and speech normal  PSYCH: mentation appears normal, affect normal/bright    Diagnostic Test Results:  Labs reviewed in Epic        Assessment & Plan     1. Urine frequency  UA shows trace blood and bacteria, will culture prior to determining treatment.  PSA and glucose also checked today for urinary frequency  - *UA reflex to Microscopic and Culture ([place] [facility] (except [place] and [place])  - Basic metabolic panel  - PSA, screen  - Urine Microscopic  - Urine Culture Aerobic Bacterial    2. Dermatitis  - clobetasol (TEMOVATE) 0.05 % external cream; Apply topically 2 times daily  Dispense: 60 g; Refill: 0  - SKIN CARE REFERRAL    3. Acute low back pain, unspecified back pain laterality, with sciatica presence unspecified  - cyclobenzaprine (FLEXERIL) 5 MG tablet; Take 1 tablet (5 mg) by mouth 3 times daily as needed for muscle spasms  Dispense: 20 tablet; Refill: 0    4. Tension headache  - cyclobenzaprine (FLEXERIL) 5 MG tablet; Take 1 tablet (5 mg) by mouth 3 times daily as needed for muscle spasms  Dispense: 20 tablet;  Refill: 0     Tobacco Cessation:   reports that he has been smoking cigarettes.  He has been smoking about 0.50 packs per day. He has never used smokeless tobacco.        BMI:   Estimated body mass index is 26.04 kg/m  as calculated from the following:    Height as of this encounter: 1.829 m (6').    Weight as of this encounter: 87.1 kg (192 lb).       Follow up in 1-2 weeks, will constat him with results of culture    No follow-ups on file.    Evan Bedolla PA-C  [facility]

## 2019-08-06 LAB
ANION GAP SERPL CALCULATED.3IONS-SCNC: 6 MMOL/L (ref 3–14)
BACTERIA SPEC CULT: NORMAL
BUN SERPL-MCNC: 4 MG/DL (ref 7–30)
CALCIUM SERPL-MCNC: 8.8 MG/DL (ref 8.5–10.1)
CHLORIDE SERPL-SCNC: 107 MMOL/L (ref 94–109)
CO2 SERPL-SCNC: 27 MMOL/L (ref 20–32)
CREAT SERPL-MCNC: 0.74 MG/DL (ref 0.66–1.25)
GFR SERPL CREATININE-BSD FRML MDRD: >90 ML/MIN/{1.73_M2}
GLUCOSE SERPL-MCNC: 92 MG/DL (ref 70–99)
POTASSIUM SERPL-SCNC: 3.9 MMOL/L (ref 3.4–5.3)
PSA SERPL-ACNC: 1 UG/L (ref 0–4)
SODIUM SERPL-SCNC: 140 MMOL/L (ref 133–144)
SPECIMEN SOURCE: NORMAL

## 2019-08-09 ENCOUNTER — TELEPHONE (OUTPATIENT)
Dept: FAMILY MEDICINE | Facility: CLINIC | Age: 39
End: 2019-08-09

## 2019-08-09 DIAGNOSIS — R35.0 URINARY FREQUENCY: Primary | ICD-10-CM

## 2019-08-09 NOTE — TELEPHONE ENCOUNTER
Please call Patient and inform him that his urine culture is negative for infection.  His PSA test is normal as well as his blood sugar. PLease have his continue flexeril for his back pain and limit NSAIDs and tylenol due to concern for rebound headache. He should have a repeat US in 2 weeks at lab only

## 2019-08-12 NOTE — TELEPHONE ENCOUNTER
Patient was notified with information noted by provider and agreed with plan.  CECI WaiteN, RN  [employer] [facility]

## 2019-09-05 ENCOUNTER — TELEPHONE (OUTPATIENT)
Dept: FAMILY MEDICINE | Facility: CLINIC | Age: 39
End: 2019-09-05

## 2019-09-05 ENCOUNTER — OFFICE VISIT (OUTPATIENT)
Dept: FAMILY MEDICINE | Facility: CLINIC | Age: 39
End: 2019-09-05
Payer: COMMERCIAL

## 2019-09-05 VITALS — DIASTOLIC BLOOD PRESSURE: 70 MMHG | SYSTOLIC BLOOD PRESSURE: 110 MMHG

## 2019-09-05 DIAGNOSIS — L85.3 XEROSIS OF SKIN: ICD-10-CM

## 2019-09-05 DIAGNOSIS — L29.9 LOCALIZED PRURITUS: Primary | ICD-10-CM

## 2019-09-05 DIAGNOSIS — L20.81 ATOPIC NEURODERMATITIS: ICD-10-CM

## 2019-09-05 LAB
KOH PREP SPEC: NORMAL
SPECIMEN SOURCE: NORMAL

## 2019-09-05 PROCEDURE — 99214 OFFICE O/P EST MOD 30 MIN: CPT | Performed by: PHYSICIAN ASSISTANT

## 2019-09-05 PROCEDURE — 87220 TISSUE EXAM FOR FUNGI: CPT | Performed by: PHYSICIAN ASSISTANT

## 2019-09-05 RX ORDER — LORATADINE 10 MG/1
TABLET ORAL
Qty: 60 TABLET | Refills: 1 | Status: SHIPPED | OUTPATIENT
Start: 2019-09-05

## 2019-09-05 RX ORDER — LORATADINE 10 MG/1
TABLET ORAL
Qty: 60 TABLET | Refills: 1 | Status: SHIPPED | OUTPATIENT
Start: 2019-09-05 | End: 2019-09-05

## 2019-09-05 RX ORDER — CETIRIZINE HYDROCHLORIDE 10 MG/1
TABLET ORAL
Qty: 60 TABLET | Refills: 1 | Status: SHIPPED | OUTPATIENT
Start: 2019-09-05 | End: 2019-09-05

## 2019-09-05 RX ORDER — CLOBETASOL PROPIONATE 0.5 MG/G
CREAM TOPICAL 2 TIMES DAILY
Qty: 60 G | Refills: 1 | Status: SHIPPED | OUTPATIENT
Start: 2019-09-05

## 2019-09-05 RX ORDER — CLOBETASOL PROPIONATE 0.5 MG/G
CREAM TOPICAL 2 TIMES DAILY
Qty: 60 G | Refills: 1 | Status: SHIPPED | OUTPATIENT
Start: 2019-09-05 | End: 2019-09-05

## 2019-09-05 RX ORDER — CETIRIZINE HYDROCHLORIDE 10 MG/1
TABLET ORAL
Qty: 60 TABLET | Refills: 1 | Status: SHIPPED | OUTPATIENT
Start: 2019-09-05

## 2019-09-05 NOTE — PATIENT INSTRUCTIONS
Proper skin care from [facility]:    -Eliminate harsh soaps as they strip the natural oils from the skin, often resulting in dry itchy skin ( i.e. Dial, Zest, Bonita Spring)  -Use mild soaps such as Cetaphil or Dove Sensitive Skin in the shower. You do not need to use soap on arms, legs, and trunk every time you shower unless visibly soiled.   -Avoid hot or cold showers.  -After showering, lightly dry off and apply moisturizing within 2-3 minutes. This will help trap moisture in the skin.   -Aggressive use of a moisturizer at least 1-2 times a day to the entire body (including -Vanicream, Cetaphil, Aquaphor or Cerave) and moisturize hands after every washing.  -We recommend using moisturizers that come in a tub that needs to be scooped out, not a pump. This has more of an oil base. It will hold moisture in your skin much better than a water base moisturizer. The above recommended are non-pore clogging.      Wear a sunscreen with at least SPF 30 on your face, ears, neck and V of the chest daily. Wear sunscreen on other areas of the body if those areas are exposed to the sun throughout the day. Sunscreens can contain physical and/or chemical blockers. Physical blockers are less likely to clog pores, these include zinc oxide and titanium dioxide. Reapply every two hour and after swimming. Sunscreen examples include Neutrogena, CeraVe, Blue Lizard, Elta MD and many others.    UV radiation  UVA radiation remains constant throughout the day and throughout the year. It is a longer wavelength than UVB and therefore penetrates deeper into the skin leading to immediate and delayed tanning, photoaging, and skin cancer. 70-80% of UVA and UVB radiation occurs between the hours of 10am-2pm.  UVB radiation  UVB radiation causes the most harmful effects and is more significant during the summer months. However, snow and ice can reflect UVB radiation leading to skin damage during the winter months as well. UVB radiation is  responsible for tanning, burning, inflammation, delayed erythema (pinkness), pigmentation (brown spots), and skin cancer.   Apply a thin layer of  topical steroid clobetasol to affected area two times a day for 2 weeks, tapering with improvement.  Use a gentle cleanser in the shower.  Moisturized 2x/day with gentle moisturizing cream for one week. Then once a day going forward.   Keep skin cool. Avoid hot water use on body and thick occlusive clothing, both can irritate skin  Begin 2 pills of Loratadine (Claritin) in the morning   Begin 2 pills of Cetirizine (Zyrtec) in the evening   Can purchase Sarna SENSITIVE lotion- I usually recommend creams but this lotion has anti itch properties that do not have the risk of thinning out the skin. Please use this in addition to your daily moisturizing cream if needed.  If you are unable to reach your back consider purchasing a lotion applicator.     Side effects of topical steroids including but not limited to atrophy (skin thinning), striae (stretch marks) telangiectasias, steroid acne, and others. Do not apply to normal skin. Do not apply to discolored skin that does not have rash present. Educated patient on post inflammatory hyperpigmentation.       Proper skin care from [facility]:    -Eliminate harsh soaps as they strip the natural oils from the skin, often resulting in dry itchy skin ( i.e. Dial, Zest, Gambian Spring, Ivory)  -Use mild soaps or soap alternatitives such as Cetaphil or Dove Sensitive Skin in the shower. You do not need to use soap on arms, legs, and trunk every time you shower unless visibly soiled.   -Avoid hot or cold showers.  -After showering, lightly dry off and apply moisturizing within 2-3 minutes. This will help trap moisture in the skin. If you were prescribed a topical medication apply that first to rash and then apply body moisturize to entire body including rash.   -Aggressive use of a moisturizer at least 2 times a day to the entire  body (including Vanicream, Cetaphil, Eucerin, CeraVe, Aquaphor or Vaseline ) and moisturize hands after every washing.  -We recommend using moisturizers that come in a tub that needs to be scooped out, not a pump. This has more of an oil base. It will hold moisture in your skin much better than a water base moisturizer. The above recommended are non-pore clogging.      Follow up in 3 weeks

## 2019-09-05 NOTE — TELEPHONE ENCOUNTER
----- Message from Genet Mclain PA-C sent at 9/5/2019  1:03 PM CDT -----  No fungus seen. No change in tx.

## 2019-09-05 NOTE — LETTER
September 5, 2019    León Myers  [address] [place] [address]        Dear León,    This is a letter regarding your completed lab results. The tested values are below and were normal. Please continue treatments discussed in clinic.    Office Visit on 09/05/2019   Component Date Value Ref Range Status     Specimen Description 09/05/2019 Midstream Urine   Final     KOH Skin Hair Nails Test 09/05/2019 No fungal elements seen   Final         Thank you for allowing me to be involved in your health care and for choosing [facility].  If you have any questions or concerns please feel free to contact me at [phone].      Sincerely,       Genet Mclain PA-C

## 2019-09-05 NOTE — PROGRESS NOTES
HPI:  I was asked to see pt by Dr. Farnsworth. León Myers is a 39 year old male patient here today for rash on legs .  Patient states this has been present for 7 years.  Patient reports the following symptoms: itchy and rash .  Patient reports the following previous treatments: clobetasol 2x a day for about 3 weeks with great improvement.  Patient reports the following modifying factors: none.  Associated symptoms: none. Pt does not moisturize.  Patient has no other skin complaints today.  Remainder of the HPI, Meds, PMH, Allergies, FH, and SH was reviewed in chart.      Past Medical History:   Diagnosis Date     Body aches 6/26/2014     Ear pain 6/26/2014     TB (tuberculosis), treated      He was treated for TB in 1991 in [facility]        Past Surgical History:   Procedure Laterality Date     NO HISTORY OF SURGERY          Family History   Problem Relation Age of Onset     Diabetes No family hx of      C.A.D. No family hx of        Social History     Socioeconomic History     Marital status:      Spouse name: Not on file     Number of children: Not on file     Years of education: Not on file     Highest education level: Not on file   Occupational History     Not on file   Social Needs     Financial resource strain: Not on file     Food insecurity:     Worry: Not on file     Inability: Not on file     Transportation needs:     Medical: Not on file     Non-medical: Not on file   Tobacco Use     Smoking status: Current Every Day Smoker     Packs/day: 0.50     Types: Cigarettes     Smokeless tobacco: Never Used   Substance and Sexual Activity     Alcohol use: No     Drug use: No     Sexual activity: Yes   Lifestyle     Physical activity:     Days per week: Not on file     Minutes per session: Not on file     Stress: Not on file   Relationships     Social connections:     Talks on phone: Not on file     Gets together: Not on file     Attends Sikhism service: Not on file     Active member of club or organization:  Not on file     Attends meetings of clubs or organizations: Not on file     Relationship status: Not on file     Intimate partner violence:     Fear of current or ex partner: Not on file     Emotionally abused: Not on file     Physically abused: Not on file     Forced sexual activity: Not on file   Other Topics Concern     Not on file   Social History Narrative    , 2 kids, smoker 4cig / day, alcohol none, working ful time assembly        Outpatient Encounter Medications as of 9/5/2019   Medication Sig Dispense Refill     clobetasol (TEMOVATE) 0.05 % external cream Apply topically 2 times daily 60 g 0     cyclobenzaprine (FLEXERIL) 5 MG tablet Take 1 tablet (5 mg) by mouth 3 times daily as needed for muscle spasms 20 tablet 0     No facility-administered encounter medications on file as of 9/5/2019.        Review Of Systems:  Skin: As above  Eyes: negative  Ears/Nose/Throat: negative  Respiratory: No shortness of breath, dyspnea on exertion, cough, or hemoptysis  Cardiovascular: negative  Gastrointestinal: negative  Genitourinary: negative  Musculoskeletal: negative  Neurologic: negative  Psychiatric: negative  Hematologic/Lymphatic/Immunologic: negative  Endocrine: negative      Objective:     /70   Eyes: Conjunctivae/lids: Normal   ENT: Lips:  Normal  MSK: Normal  Cardiovascular: Peripheral edema none  Pulm: Breathing Normal  Neuro/Psych: Orientation: Normal; Mood/Affect: Normal, NAD, WDWN  Pt accompanied by: wife and interpretor  Following areas examined: face, ventral legs  Garg skin type:v   Findings:  koh of left ventral leg- neg  Brown thickened scaly plaques on ventral legs  Generalized flaking of skin of ventral legs    Assessment and Plan:  1) atopic neurodermatitis, xerosis, a localized pruritis  Disc topicals and NBUVB    Apply a thin layer of  topical steroid clobetasol to affected area two times a day for 2 weeks, tapering with improvement.  Use a gentle cleanser in the  shower.  Moisturized 2x/day with gentle moisturizing cream for one week. Then once a day going forward.   Keep skin cool. Avoid hot water use on body and thick occlusive clothing, both can irritate skin  Begin 2 pills of Loratadine (Claritin) in the morning   Begin 2 pills of Cetirizine (Zyrtec) in the evening   Can purchase Sarna SENSITIVE lotion- I usually recommend creams but this lotion has anti itch properties that do not have the risk of thinning out the skin. Please use this in addition to your daily moisturizing cream if needed.  If you are unable to reach your back consider purchasing a lotion applicator.     Side effects of topical steroids including but not limited to atrophy (skin thinning), striae (stretch marks) telangiectasias, steroid acne, and others. Do not apply to normal skin. Do not apply to discolored skin that does not have rash present. Educated patient on post inflammatory hyperpigmentation.       Proper skin care from [facility]:    -Eliminate harsh soaps as they strip the natural oils from the skin, often resulting in dry itchy skin ( i.e. Dial, Zest, Bonita Spring, Ivory)  -Use mild soaps or soap alternatitives such as Cetaphil or Dove Sensitive Skin in the shower. You do not need to use soap on arms, legs, and trunk every time you shower unless visibly soiled.   -Avoid hot or cold showers.  -After showering, lightly dry off and apply moisturizing within 2-3 minutes. This will help trap moisture in the skin. If you were prescribed a topical medication apply that first to rash and then apply body moisturize to entire body including rash.   -Aggressive use of a moisturizer at least 2 times a day to the entire body (including Vanicream, Cetaphil, Eucerin, CeraVe, Aquaphor or Vaseline ) and moisturize hands after every washing.  -We recommend using moisturizers that come in a tub that needs to be scooped out, not a pump. This has more of an oil base. It will hold moisture in your  skin much better than a water base moisturizer. The above recommended are non-pore clogging.      Follow up in 3 weeks

## 2019-09-05 NOTE — LETTER
9/5/2019         RE: León Myers  [address] [place] [address]        Dear Colleague,    Thank you for referring your patient, León Myers, to the [facility]. Please see a copy of my visit note below.    HPI:  I was asked to see pt by Dr. Farnsworth. León Myers is a 39 year old male patient here today for rash on legs .  Patient states this has been present for 7 years.  Patient reports the following symptoms: itchy and rash .  Patient reports the following previous treatments: clobetasol 2x a day for about 3 weeks with great improvement.  Patient reports the following modifying factors: none.  Associated symptoms: none. Pt does not moisturize.  Patient has no other skin complaints today.  Remainder of the HPI, Meds, PMH, Allergies, FH, and SH was reviewed in chart.      Past Medical History:   Diagnosis Date     Body aches 6/26/2014     Ear pain 6/26/2014     TB (tuberculosis), treated      He was treated for TB in 1991 in [facility]        Past Surgical History:   Procedure Laterality Date     NO HISTORY OF SURGERY          Family History   Problem Relation Age of Onset     Diabetes No family hx of      C.A.D. No family hx of        Social History     Socioeconomic History     Marital status:      Spouse name: Not on file     Number of children: Not on file     Years of education: Not on file     Highest education level: Not on file   Occupational History     Not on file   Social Needs     Financial resource strain: Not on file     Food insecurity:     Worry: Not on file     Inability: Not on file     Transportation needs:     Medical: Not on file     Non-medical: Not on file   Tobacco Use     Smoking status: Current Every Day Smoker     Packs/day: 0.50     Types: Cigarettes     Smokeless tobacco: Never Used   Substance and Sexual Activity     Alcohol use: No     Drug use: No     Sexual activity: Yes   Lifestyle     Physical activity:     Days per week: Not on file      Minutes per session: Not on file     Stress: Not on file   Relationships     Social connections:     Talks on phone: Not on file     Gets together: Not on file     Attends Church service: Not on file     Active member of club or organization: Not on file     Attends meetings of clubs or organizations: Not on file     Relationship status: Not on file     Intimate partner violence:     Fear of current or ex partner: Not on file     Emotionally abused: Not on file     Physically abused: Not on file     Forced sexual activity: Not on file   Other Topics Concern     Not on file   Social History Narrative    , 2 kids, smoker 4cig / day, alcohol none, working ful time assembly        Outpatient Encounter Medications as of 9/5/2019   Medication Sig Dispense Refill     clobetasol (TEMOVATE) 0.05 % external cream Apply topically 2 times daily 60 g 0     cyclobenzaprine (FLEXERIL) 5 MG tablet Take 1 tablet (5 mg) by mouth 3 times daily as needed for muscle spasms 20 tablet 0     No facility-administered encounter medications on file as of 9/5/2019.        Review Of Systems:  Skin: As above  Eyes: negative  Ears/Nose/Throat: negative  Respiratory: No shortness of breath, dyspnea on exertion, cough, or hemoptysis  Cardiovascular: negative  Gastrointestinal: negative  Genitourinary: negative  Musculoskeletal: negative  Neurologic: negative  Psychiatric: negative  Hematologic/Lymphatic/Immunologic: negative  Endocrine: negative      Objective:     /70   Eyes: Conjunctivae/lids: Normal   ENT: Lips:  Normal  MSK: Normal  Cardiovascular: Peripheral edema none  Pulm: Breathing Normal  Neuro/Psych: Orientation: Normal; Mood/Affect: Normal, NAD, WDWN  Pt accompanied by: wife and interpretor  Following areas examined: face, ventral legs  Garg skin type:v   Findings:  koh of left ventral leg- neg  Brown thickened scaly plaques on ventral legs  Generalized flaking of skin of ventral legs    Assessment and  Plan:  1) atopic neurodermatitis, xerosis, a localized pruritis  Disc topicals and NBUVB    Apply a thin layer of  topical steroid clobetasol to affected area two times a day for 2 weeks, tapering with improvement.  Use a gentle cleanser in the shower.  Moisturized 2x/day with gentle moisturizing cream for one week. Then once a day going forward.   Keep skin cool. Avoid hot water use on body and thick occlusive clothing, both can irritate skin  Begin 2 pills of Loratadine (Claritin) in the morning   Begin 2 pills of Cetirizine (Zyrtec) in the evening   Can purchase Sarna SENSITIVE lotion- I usually recommend creams but this lotion has anti itch properties that do not have the risk of thinning out the skin. Please use this in addition to your daily moisturizing cream if needed.  If you are unable to reach your back consider purchasing a lotion applicator.     Side effects of topical steroids including but not limited to atrophy (skin thinning), striae (stretch marks) telangiectasias, steroid acne, and others. Do not apply to normal skin. Do not apply to discolored skin that does not have rash present. Educated patient on post inflammatory hyperpigmentation.       Proper skin care from [facility]:    -Eliminate harsh soaps as they strip the natural oils from the skin, often resulting in dry itchy skin ( i.e. Dial, Zest, Bonita [place], Ivory)  -Use mild soaps or soap alternatitives such as Cetaphil or Dove Sensitive Skin in the shower. You do not need to use soap on arms, legs, and trunk every time you shower unless visibly soiled.   -Avoid hot or cold showers.  -After showering, lightly dry off and apply moisturizing within 2-3 minutes. This will help trap moisture in the skin. If you were prescribed a topical medication apply that first to rash and then apply body moisturize to entire body including rash.   -Aggressive use of a moisturizer at least 2 times a day to the entire body (including Vanicream,  Cetaphil, Eucerin, CeraVe, Aquaphor or Vaseline ) and moisturize hands after every washing.  -We recommend using moisturizers that come in a tub that needs to be scooped out, not a pump. This has more of an oil base. It will hold moisture in your skin much better than a water base moisturizer. The above recommended are non-pore clogging.      Follow up in 3 weeks          Again, thank you for allowing me to participate in the care of your patient.        Sincerely,        Genet Mclain PA-C

## 2022-07-21 NOTE — MR AVS SNAPSHOT
After Visit Summary   9/15/2017    León Myers    MRN: [id number]           Patient Information     Date Of Birth          1980        Visit Information        Provider Department      9/15/2017 9:30 AM Sidney Corona MD; [employer] [facility] [place]        Today's Diagnoses     Psoriasis    -  1    Need for prophylactic vaccination and inoculation against influenza        Need for prophylactic vaccination with tetanus-diphtheria (TD)           Follow-ups after your visit        Additional Services     DERMATOLOGY REFERRAL       Your provider has referred you to: [facility] [place] [phone]   [email or web address]    Please be aware that coverage of these services is subject to the terms and limitations of your health insurance plan.  Call member services at your health plan with any benefit or coverage questions.      Please bring the following with you to your appointment:    (1) Any X-Rays, CTs or MRIs which have been performed.  Contact the facility where they were done to arrange for  prior to your scheduled appointment.    (2) List of current medications  (3) This referral request   (4) Any documents/labs given to you for this referral                  Who to contact     If you have questions or need follow up information about today's clinic visit or your schedule please contact [facility] [place] directly at [phone].  Normal or non-critical lab and imaging results will be communicated to you by MyChart, letter or phone within 4 business days after the clinic has received the results. If you do not hear from us within 7 days, please contact the clinic through MyChart or phone. If you have a critical or abnormal lab result, we will notify you by phone as soon as possible.  Submit refill requests through [employer] or call your pharmacy and they will forward the refill request to us. Please allow 3  "business days for your refill to be completed.          Additional Information About Your Visit        [employer] Information     [employer] lets you send messages to your doctor, view your test results, renew your prescriptions, schedule appointments and more. To sign up, go to [email or web address] . Click on \"Log in\" on the left side of the screen, which will take you to the Welcome page. Then click on \"Sign up Now\" on the right side of the page.     You will be asked to enter the access code listed below, as well as some personal information. Please follow the directions to create your username and password.     Your access code is: [email or web address]  Expires: 2017 10:04 AM     Your access code will  in 90 days. If you need help or a new code, please call your [facility] clinic or [phone].        Care EveryWhere ID     This is your Care EveryWhere ID. This could be used by other organizations to access your [facility] medical records  [id number]        Your Vitals Were     Pulse Temperature Height Pulse Oximetry BMI (Body Mass Index)       92 97.2  F (36.2  C) (Tympanic) 6' (1.829 m) 97% 25.77 kg/m2        Blood Pressure from Last 3 Encounters:   09/15/17 104/70   16 100/80   12/01/15 120/83    Weight from Last 3 Encounters:   09/15/17 190 lb (86.2 kg)   16 183 lb (83 kg)   12/01/15 175 lb (79.4 kg)              We Performed the Following     DERMATOLOGY REFERRAL     FLU VAC, SPLIT VIRUS IM > 3 YO (QUADRIVALENT) [78854]     TDAP VACCINE (ADACEL)          Today's Medication Changes          These changes are accurate as of: 9/15/17 10:04 AM.  If you have any questions, ask your nurse or doctor.               Start taking these medicines.        Dose/Directions    clobetasol 0.05 % ointment   Commonly known as:  TEMOVATE   Used for:  Psoriasis   Started by:  Sidney Corona MD        Apply sparingly to affected area twice daily as needed.  Do not apply to face.   Quantity:  60 g   Refills:  3       "   Stop taking these medicines if you haven't already. Please contact your care team if you have questions.     benzonatate 100 MG capsule   Commonly known as:  TESSALON   Stopped by:  Sidney Corona MD           nabumetone 500 MG tablet   Commonly known as:  RELAFEN   Stopped by:  Sidney Corona MD           naproxen 500 MG tablet   Commonly known as:  NAPROSYN   Stopped by:  Sidney Corona MD           omeprazole 40 MG capsule   Commonly known as:  priLOSEC   Stopped by:  Sidney Corona MD           triamcinolone 0.1 % cream   Commonly known as:  KENALOG   Stopped by:  Sidney Corona MD           vitamin D 2000 UNITS tablet   Stopped by:  Sidney Corona MD                Where to get your medicines      These medications were sent to [facility] [id number]  [place], [place] - [address]  AT [address]  [address] , [place] [address]     Phone:  [phone]     clobetasol 0.05 % ointment                Primary Care Provider Office Phone #    [facility] [phone]       No address on file        Equal Access to Services     [facility] Center[facility] AH: Hadii aad ku hadasho Soomaali, waaxda luqadaha, qaybta kaalmada adeegyada, waxay idiin hayaan adeemy kharashai laantonio ah. So [facility] [phone].    ATENCIÓN: Si habla español, tiene a fraser disposición servicios gratuitos de asistencia lingüística. Llame al [phone].    We comply with applicable federal civil rights laws and [place] laws. We do not discriminate on the basis of race, color, national origin, age, disability sex, sexual orientation or gender identity.            Thank you!     Thank you for choosing [facility] [place]  for your care. Our goal is always to provide you with excellent care. Hearing back from our patients is one way we can continue to improve our services. Please take a few minutes to complete the written survey that you may receive in the mail after your visit with us. Thank you!              Your Updated Medication List - Protect others around you: Learn how to safely use, store and throw away your medicines at www.disposemymeds.org.          This list is accurate as of: 9/15/17 10:04 AM.  Always use your most recent med list.                   Brand Name Dispense Instructions for use Diagnosis    clobetasol 0.05 % ointment    TEMOVATE    60 g    Apply sparingly to affected area twice daily as needed.  Do not apply to face.    Psoriasis          Quality 111:Pneumonia Vaccination Status For Older Adults: Pneumococcal vaccine administered on or after patient’s 60th birthday and before the end of the measurement period Quality 130: Documentation Of Current Medications In The Medical Record: Current Medications Documented Detail Level: Detailed